# Patient Record
Sex: FEMALE | Race: BLACK OR AFRICAN AMERICAN | NOT HISPANIC OR LATINO
[De-identification: names, ages, dates, MRNs, and addresses within clinical notes are randomized per-mention and may not be internally consistent; named-entity substitution may affect disease eponyms.]

---

## 2020-08-13 PROBLEM — Z00.00 ENCOUNTER FOR PREVENTIVE HEALTH EXAMINATION: Status: ACTIVE | Noted: 2020-08-13

## 2020-08-21 ENCOUNTER — APPOINTMENT (OUTPATIENT)
Dept: PULMONOLOGY | Facility: CLINIC | Age: 55
End: 2020-08-21

## 2020-09-13 ENCOUNTER — RESULT REVIEW (OUTPATIENT)
Age: 55
End: 2020-09-13

## 2020-09-13 ENCOUNTER — INPATIENT (INPATIENT)
Facility: HOSPITAL | Age: 55
LOS: 7 days | Discharge: HOME | DRG: 314 | End: 2020-09-21
Attending: THORACIC SURGERY (CARDIOTHORACIC VASCULAR SURGERY) | Admitting: THORACIC SURGERY (CARDIOTHORACIC VASCULAR SURGERY)
Payer: MEDICAID

## 2020-09-13 VITALS — TEMPERATURE: 98 F

## 2020-09-13 DIAGNOSIS — F17.200 NICOTINE DEPENDENCE, UNSPECIFIED, UNCOMPLICATED: ICD-10-CM

## 2020-09-13 DIAGNOSIS — I10 ESSENTIAL (PRIMARY) HYPERTENSION: ICD-10-CM

## 2020-09-13 DIAGNOSIS — I31.4 CARDIAC TAMPONADE: ICD-10-CM

## 2020-09-13 DIAGNOSIS — I31.3 PERICARDIAL EFFUSION (NONINFLAMMATORY): ICD-10-CM

## 2020-09-13 DIAGNOSIS — Z29.9 ENCOUNTER FOR PROPHYLACTIC MEASURES, UNSPECIFIED: ICD-10-CM

## 2020-09-13 DIAGNOSIS — N18.9 CHRONIC KIDNEY DISEASE, UNSPECIFIED: ICD-10-CM

## 2020-09-13 DIAGNOSIS — J44.9 CHRONIC OBSTRUCTIVE PULMONARY DISEASE, UNSPECIFIED: ICD-10-CM

## 2020-09-13 LAB
A1C WITH ESTIMATED AVERAGE GLUCOSE RESULT: 5.7 % — HIGH (ref 4–5.6)
ABO RH CONFIRMATION: SIGNIFICANT CHANGE UP
ALBUMIN FLD-MCNC: 3.5 G/DL — SIGNIFICANT CHANGE UP
ALBUMIN SERPL ELPH-MCNC: 3.5 G/DL — SIGNIFICANT CHANGE UP (ref 3.3–5.2)
ALP SERPL-CCNC: 82 U/L — SIGNIFICANT CHANGE UP (ref 40–120)
ALT FLD-CCNC: 12 U/L — SIGNIFICANT CHANGE UP
ANION GAP SERPL CALC-SCNC: 12 MMOL/L — SIGNIFICANT CHANGE UP (ref 5–17)
APPEARANCE UR: CLEAR — SIGNIFICANT CHANGE UP
APTT BLD: 26.8 SEC — LOW (ref 27.5–35.5)
AST SERPL-CCNC: 15 U/L — SIGNIFICANT CHANGE UP
B PERT IGG+IGM PNL SER: ABNORMAL
BACTERIA # UR AUTO: ABNORMAL
BASOPHILS # BLD AUTO: 0.03 K/UL — SIGNIFICANT CHANGE UP (ref 0–0.2)
BASOPHILS NFR BLD AUTO: 0.3 % — SIGNIFICANT CHANGE UP (ref 0–2)
BILIRUB SERPL-MCNC: <0.2 MG/DL — LOW (ref 0.4–2)
BILIRUB UR-MCNC: NEGATIVE — SIGNIFICANT CHANGE UP
BLD GP AB SCN SERPL QL: SIGNIFICANT CHANGE UP
BUN SERPL-MCNC: 52 MG/DL — HIGH (ref 8–20)
CALCIUM SERPL-MCNC: 9 MG/DL — SIGNIFICANT CHANGE UP (ref 8.6–10.2)
CHLORIDE SERPL-SCNC: 97 MMOL/L — LOW (ref 98–107)
CO2 SERPL-SCNC: 27 MMOL/L — SIGNIFICANT CHANGE UP (ref 22–29)
COLOR FLD: ABNORMAL
COLOR SPEC: YELLOW — SIGNIFICANT CHANGE UP
CREAT SERPL-MCNC: 3.86 MG/DL — HIGH (ref 0.5–1.3)
DIFF PNL FLD: ABNORMAL
EOSINOPHIL # BLD AUTO: 0.16 K/UL — SIGNIFICANT CHANGE UP (ref 0–0.5)
EOSINOPHIL NFR BLD AUTO: 1.6 % — SIGNIFICANT CHANGE UP (ref 0–6)
EPI CELLS # UR: SIGNIFICANT CHANGE UP
ESTIMATED AVERAGE GLUCOSE: 117 MG/DL — HIGH (ref 68–114)
FLUID INTAKE SUBSTANCE CLASS: SIGNIFICANT CHANGE UP
FLUID SEGMENTED GRANULOCYTES: 3 % — SIGNIFICANT CHANGE UP
GLUCOSE FLD-MCNC: 49 MG/DL — SIGNIFICANT CHANGE UP
GLUCOSE SERPL-MCNC: 104 MG/DL — HIGH (ref 70–99)
GLUCOSE UR QL: NEGATIVE MG/DL — SIGNIFICANT CHANGE UP
GRAM STN FLD: SIGNIFICANT CHANGE UP
HCT VFR BLD CALC: 38 % — SIGNIFICANT CHANGE UP (ref 34.5–45)
HGB BLD-MCNC: 11.8 G/DL — SIGNIFICANT CHANGE UP (ref 11.5–15.5)
IMM GRANULOCYTES NFR BLD AUTO: 1.3 % — SIGNIFICANT CHANGE UP (ref 0–1.5)
INR BLD: 1.14 RATIO — SIGNIFICANT CHANGE UP (ref 0.88–1.16)
KETONES UR-MCNC: NEGATIVE — SIGNIFICANT CHANGE UP
LDH SERPL L TO P-CCNC: 553 U/L — SIGNIFICANT CHANGE UP
LEUKOCYTE ESTERASE UR-ACNC: NEGATIVE — SIGNIFICANT CHANGE UP
LYMPHOCYTES # BLD AUTO: 1.78 K/UL — SIGNIFICANT CHANGE UP (ref 1–3.3)
LYMPHOCYTES # BLD AUTO: 17.5 % — SIGNIFICANT CHANGE UP (ref 13–44)
LYMPHOCYTES # FLD: 6 % — SIGNIFICANT CHANGE UP
MCHC RBC-ENTMCNC: 29.1 PG — SIGNIFICANT CHANGE UP (ref 27–34)
MCHC RBC-ENTMCNC: 31.1 GM/DL — LOW (ref 32–36)
MCV RBC AUTO: 93.8 FL — SIGNIFICANT CHANGE UP (ref 80–100)
MESOTHL CELL # FLD: 75 % — SIGNIFICANT CHANGE UP
MONOCYTES # BLD AUTO: 0.82 K/UL — SIGNIFICANT CHANGE UP (ref 0–0.9)
MONOCYTES NFR BLD AUTO: 8.1 % — SIGNIFICANT CHANGE UP (ref 2–14)
MONOS+MACROS # FLD: 16 % — SIGNIFICANT CHANGE UP
MRSA PCR RESULT.: SIGNIFICANT CHANGE UP
NEUTROPHILS # BLD AUTO: 7.25 K/UL — SIGNIFICANT CHANGE UP (ref 1.8–7.4)
NEUTROPHILS NFR BLD AUTO: 71.2 % — SIGNIFICANT CHANGE UP (ref 43–77)
NITRITE UR-MCNC: NEGATIVE — SIGNIFICANT CHANGE UP
NT-PROBNP SERPL-SCNC: 127 PG/ML — SIGNIFICANT CHANGE UP (ref 0–300)
PH UR: 6 — SIGNIFICANT CHANGE UP (ref 5–8)
PLATELET # BLD AUTO: 292 K/UL — SIGNIFICANT CHANGE UP (ref 150–400)
POTASSIUM SERPL-MCNC: 4.6 MMOL/L — SIGNIFICANT CHANGE UP (ref 3.5–5.3)
POTASSIUM SERPL-SCNC: 4.6 MMOL/L — SIGNIFICANT CHANGE UP (ref 3.5–5.3)
PROT FLD-MCNC: 5.3 G/DL — SIGNIFICANT CHANGE UP
PROT SERPL-MCNC: 6.9 G/DL — SIGNIFICANT CHANGE UP (ref 6.6–8.7)
PROT UR-MCNC: 100 MG/DL
PROTHROM AB SERPL-ACNC: 13.1 SEC — SIGNIFICANT CHANGE UP (ref 10.6–13.6)
RBC # BLD: 4.05 M/UL — SIGNIFICANT CHANGE UP (ref 3.8–5.2)
RBC # FLD: 17 % — HIGH (ref 10.3–14.5)
RBC CASTS # UR COMP ASSIST: SIGNIFICANT CHANGE UP /HPF (ref 0–4)
RCV VOL RI: 3000 /UL — HIGH (ref 0–0)
S AUREUS DNA NOSE QL NAA+PROBE: SIGNIFICANT CHANGE UP
SODIUM SERPL-SCNC: 136 MMOL/L — SIGNIFICANT CHANGE UP (ref 135–145)
SP GR SPEC: 1.01 — SIGNIFICANT CHANGE UP (ref 1.01–1.02)
SPECIMEN SOURCE: SIGNIFICANT CHANGE UP
TOTAL NUCLEATED CELL COUNT, BODY FLUID: 1974 /UL — SIGNIFICANT CHANGE UP
TSH SERPL-MCNC: 1.86 UIU/ML — SIGNIFICANT CHANGE UP (ref 0.27–4.2)
TUBE TYPE: SIGNIFICANT CHANGE UP
UROBILINOGEN FLD QL: NEGATIVE MG/DL — SIGNIFICANT CHANGE UP
WBC # BLD: 10.17 K/UL — SIGNIFICANT CHANGE UP (ref 3.8–10.5)
WBC # FLD AUTO: 10.17 K/UL — SIGNIFICANT CHANGE UP (ref 3.8–10.5)
WBC UR QL: SIGNIFICANT CHANGE UP

## 2020-09-13 PROCEDURE — 88305 TISSUE EXAM BY PATHOLOGIST: CPT | Mod: 26

## 2020-09-13 PROCEDURE — 88342 IMHCHEM/IMCYTCHM 1ST ANTB: CPT | Mod: 26

## 2020-09-13 PROCEDURE — 88112 CYTOPATH CELL ENHANCE TECH: CPT | Mod: 26

## 2020-09-13 PROCEDURE — 93306 TTE W/DOPPLER COMPLETE: CPT | Mod: 26

## 2020-09-13 PROCEDURE — 99222 1ST HOSP IP/OBS MODERATE 55: CPT

## 2020-09-13 PROCEDURE — 99223 1ST HOSP IP/OBS HIGH 75: CPT

## 2020-09-13 PROCEDURE — 88341 IMHCHEM/IMCYTCHM EA ADD ANTB: CPT | Mod: 26

## 2020-09-13 PROCEDURE — 93308 TTE F-UP OR LMTD: CPT | Mod: 26,76,59

## 2020-09-13 PROCEDURE — 71045 X-RAY EXAM CHEST 1 VIEW: CPT | Mod: 26

## 2020-09-13 PROCEDURE — 71250 CT THORAX DX C-: CPT | Mod: 26

## 2020-09-13 RX ORDER — LANOLIN ALCOHOL/MO/W.PET/CERES
5 CREAM (GRAM) TOPICAL AT BEDTIME
Refills: 0 | Status: DISCONTINUED | OUTPATIENT
Start: 2020-09-13 | End: 2020-09-21

## 2020-09-13 RX ORDER — CEFTRIAXONE 500 MG/1
2000 INJECTION, POWDER, FOR SOLUTION INTRAMUSCULAR; INTRAVENOUS EVERY 24 HOURS
Refills: 0 | Status: COMPLETED | OUTPATIENT
Start: 2020-09-13 | End: 2020-09-17

## 2020-09-13 RX ORDER — FENTANYL CITRATE 50 UG/ML
25 INJECTION INTRAVENOUS ONCE
Refills: 0 | Status: DISCONTINUED | OUTPATIENT
Start: 2020-09-13 | End: 2020-09-13

## 2020-09-13 RX ORDER — PANTOPRAZOLE SODIUM 20 MG/1
40 TABLET, DELAYED RELEASE ORAL
Refills: 0 | Status: DISCONTINUED | OUTPATIENT
Start: 2020-09-13 | End: 2020-09-21

## 2020-09-13 RX ORDER — MIRTAZAPINE 45 MG/1
15 TABLET, ORALLY DISINTEGRATING ORAL AT BEDTIME
Refills: 0 | Status: DISCONTINUED | OUTPATIENT
Start: 2020-09-13 | End: 2020-09-21

## 2020-09-13 RX ORDER — SODIUM CHLORIDE 9 MG/ML
3 INJECTION INTRAMUSCULAR; INTRAVENOUS; SUBCUTANEOUS EVERY 8 HOURS
Refills: 0 | Status: DISCONTINUED | OUTPATIENT
Start: 2020-09-13 | End: 2020-09-21

## 2020-09-13 RX ORDER — ATORVASTATIN CALCIUM 80 MG/1
40 TABLET, FILM COATED ORAL AT BEDTIME
Refills: 0 | Status: DISCONTINUED | OUTPATIENT
Start: 2020-09-13 | End: 2020-09-21

## 2020-09-13 RX ORDER — INFLUENZA VIRUS VACCINE 15; 15; 15; 15 UG/.5ML; UG/.5ML; UG/.5ML; UG/.5ML
0.5 SUSPENSION INTRAMUSCULAR ONCE
Refills: 0 | Status: DISCONTINUED | OUTPATIENT
Start: 2020-09-13 | End: 2020-09-21

## 2020-09-13 RX ORDER — AMLODIPINE BESYLATE 2.5 MG/1
10 TABLET ORAL DAILY
Refills: 0 | Status: DISCONTINUED | OUTPATIENT
Start: 2020-09-13 | End: 2020-09-21

## 2020-09-13 RX ORDER — ACETAMINOPHEN 500 MG
650 TABLET ORAL EVERY 6 HOURS
Refills: 0 | Status: DISCONTINUED | OUTPATIENT
Start: 2020-09-13 | End: 2020-09-19

## 2020-09-13 RX ORDER — ASPIRIN/CALCIUM CARB/MAGNESIUM 324 MG
81 TABLET ORAL DAILY
Refills: 0 | Status: DISCONTINUED | OUTPATIENT
Start: 2020-09-13 | End: 2020-09-13

## 2020-09-13 RX ORDER — FLUTICASONE PROPIONATE 50 MCG
1 SPRAY, SUSPENSION NASAL
Refills: 0 | Status: DISCONTINUED | OUTPATIENT
Start: 2020-09-13 | End: 2020-09-21

## 2020-09-13 RX ORDER — OXYCODONE HYDROCHLORIDE 5 MG/1
10 TABLET ORAL EVERY 6 HOURS
Refills: 0 | Status: DISCONTINUED | OUTPATIENT
Start: 2020-09-13 | End: 2020-09-19

## 2020-09-13 RX ORDER — ACETAMINOPHEN 500 MG
1000 TABLET ORAL ONCE
Refills: 0 | Status: COMPLETED | OUTPATIENT
Start: 2020-09-13 | End: 2020-09-13

## 2020-09-13 RX ORDER — ALBUTEROL 90 UG/1
2 AEROSOL, METERED ORAL EVERY 6 HOURS
Refills: 0 | Status: DISCONTINUED | OUTPATIENT
Start: 2020-09-13 | End: 2020-09-21

## 2020-09-13 RX ORDER — HEPARIN SODIUM 5000 [USP'U]/ML
5000 INJECTION INTRAVENOUS; SUBCUTANEOUS EVERY 12 HOURS
Refills: 0 | Status: DISCONTINUED | OUTPATIENT
Start: 2020-09-13 | End: 2020-09-13

## 2020-09-13 RX ORDER — NICOTINE POLACRILEX 2 MG
1 GUM BUCCAL DAILY
Refills: 0 | Status: DISCONTINUED | OUTPATIENT
Start: 2020-09-13 | End: 2020-09-21

## 2020-09-13 RX ORDER — OXYCODONE HYDROCHLORIDE 5 MG/1
5 TABLET ORAL EVERY 6 HOURS
Refills: 0 | Status: DISCONTINUED | OUTPATIENT
Start: 2020-09-13 | End: 2020-09-19

## 2020-09-13 RX ORDER — LACTOBACILLUS ACIDOPHILUS 100MM CELL
1 CAPSULE ORAL
Refills: 0 | Status: DISCONTINUED | OUTPATIENT
Start: 2020-09-13 | End: 2020-09-21

## 2020-09-13 RX ADMIN — Medication 110 MILLIGRAM(S): at 18:03

## 2020-09-13 RX ADMIN — ATORVASTATIN CALCIUM 40 MILLIGRAM(S): 80 TABLET, FILM COATED ORAL at 21:44

## 2020-09-13 RX ADMIN — Medication 1000 MILLIGRAM(S): at 10:10

## 2020-09-13 RX ADMIN — ALBUTEROL 2 PUFF(S): 90 AEROSOL, METERED ORAL at 20:04

## 2020-09-13 RX ADMIN — Medication 1 TABLET(S): at 19:12

## 2020-09-13 RX ADMIN — Medication 1 SPRAY(S): at 18:03

## 2020-09-13 RX ADMIN — Medication 650 MILLIGRAM(S): at 18:50

## 2020-09-13 RX ADMIN — Medication 400 MILLIGRAM(S): at 09:39

## 2020-09-13 RX ADMIN — OXYCODONE HYDROCHLORIDE 10 MILLIGRAM(S): 5 TABLET ORAL at 20:18

## 2020-09-13 RX ADMIN — SODIUM CHLORIDE 3 MILLILITER(S): 9 INJECTION INTRAMUSCULAR; INTRAVENOUS; SUBCUTANEOUS at 09:50

## 2020-09-13 RX ADMIN — SODIUM CHLORIDE 3 MILLILITER(S): 9 INJECTION INTRAMUSCULAR; INTRAVENOUS; SUBCUTANEOUS at 21:47

## 2020-09-13 RX ADMIN — Medication 1 TABLET(S): at 06:39

## 2020-09-13 RX ADMIN — PANTOPRAZOLE SODIUM 40 MILLIGRAM(S): 20 TABLET, DELAYED RELEASE ORAL at 06:39

## 2020-09-13 RX ADMIN — OXYCODONE HYDROCHLORIDE 10 MILLIGRAM(S): 5 TABLET ORAL at 19:18

## 2020-09-13 RX ADMIN — Medication 1 SPRAY(S): at 10:01

## 2020-09-13 RX ADMIN — Medication 110 MILLIGRAM(S): at 06:39

## 2020-09-13 RX ADMIN — CEFTRIAXONE 100 MILLIGRAM(S): 500 INJECTION, POWDER, FOR SOLUTION INTRAMUSCULAR; INTRAVENOUS at 18:03

## 2020-09-13 RX ADMIN — MIRTAZAPINE 15 MILLIGRAM(S): 45 TABLET, ORALLY DISINTEGRATING ORAL at 21:44

## 2020-09-13 RX ADMIN — SODIUM CHLORIDE 3 MILLILITER(S): 9 INJECTION INTRAMUSCULAR; INTRAVENOUS; SUBCUTANEOUS at 06:47

## 2020-09-13 RX ADMIN — AMLODIPINE BESYLATE 10 MILLIGRAM(S): 2.5 TABLET ORAL at 06:39

## 2020-09-13 RX ADMIN — Medication 650 MILLIGRAM(S): at 19:15
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
## 2020-09-13 NOTE — H&P ADULT - PROBLEM SELECTOR PLAN 4
Labs pending  Will monitor renal function, labs, urine output closely, may need renal consult during this admission

## 2020-09-13 NOTE — H&P ADULT - ATTENDING COMMENTS
Patient seen and examined. All data and results reviewed independently. Moderate-large pericardial effusion secondary to likely end stage renal disease. Repeat ECHO this AM. NPO until repeat ECHO is performed. I reviewed all results with the Patient.     Consult cardiology for pericardiocentesis.
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
## 2020-09-13 NOTE — PATIENT PROFILE ADULT - TRANSPORTATION - CHOOSE ALL APPLY
public transportation, none available/rides, unreliable from others/public transportation, does not know how to access/car, none

## 2020-09-13 NOTE — H&P ADULT - PROBLEM SELECTOR PLAN 1
Currently hemodynamically stable  Urgent CT Chest pending, non contrast  Maintain NPO  Hold all anticoagulants

## 2020-09-13 NOTE — H&P ADULT - NSICDXPASTMEDICALHX_GEN_ALL_CORE_FT
PAST MEDICAL HISTORY:  CKD (chronic kidney disease)     COPD, mild     Current smoker     HTN (hypertension)
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
## 2020-09-14 DIAGNOSIS — D72.829 ELEVATED WHITE BLOOD CELL COUNT, UNSPECIFIED: ICD-10-CM

## 2020-09-14 DIAGNOSIS — R73.03 PREDIABETES: ICD-10-CM

## 2020-09-14 DIAGNOSIS — I10 ESSENTIAL (PRIMARY) HYPERTENSION: ICD-10-CM

## 2020-09-14 DIAGNOSIS — F31.9 BIPOLAR DISORDER, UNSPECIFIED: ICD-10-CM

## 2020-09-14 DIAGNOSIS — J18.9 PNEUMONIA, UNSPECIFIED ORGANISM: ICD-10-CM

## 2020-09-14 DIAGNOSIS — E87.5 HYPERKALEMIA: ICD-10-CM

## 2020-09-14 DIAGNOSIS — N18.9 CHRONIC KIDNEY DISEASE, UNSPECIFIED: ICD-10-CM

## 2020-09-14 LAB
ANION GAP SERPL CALC-SCNC: 11 MMOL/L — SIGNIFICANT CHANGE UP (ref 5–17)
ANION GAP SERPL CALC-SCNC: 13 MMOL/L — SIGNIFICANT CHANGE UP (ref 5–17)
BUN SERPL-MCNC: 52 MG/DL — HIGH (ref 8–20)
BUN SERPL-MCNC: 53 MG/DL — HIGH (ref 8–20)
CALCIUM SERPL-MCNC: 9 MG/DL — SIGNIFICANT CHANGE UP (ref 8.6–10.2)
CALCIUM SERPL-MCNC: 9.1 MG/DL — SIGNIFICANT CHANGE UP (ref 8.6–10.2)
CHLORIDE SERPL-SCNC: 96 MMOL/L — LOW (ref 98–107)
CHLORIDE SERPL-SCNC: 96 MMOL/L — LOW (ref 98–107)
CO2 SERPL-SCNC: 21 MMOL/L — LOW (ref 22–29)
CO2 SERPL-SCNC: 27 MMOL/L — SIGNIFICANT CHANGE UP (ref 22–29)
CREAT SERPL-MCNC: 3.17 MG/DL — HIGH (ref 0.5–1.3)
CREAT SERPL-MCNC: 3.18 MG/DL — HIGH (ref 0.5–1.3)
GLUCOSE BLDC GLUCOMTR-MCNC: 107 MG/DL — HIGH (ref 70–99)
GLUCOSE SERPL-MCNC: 103 MG/DL — HIGH (ref 70–99)
GLUCOSE SERPL-MCNC: 97 MG/DL — SIGNIFICANT CHANGE UP (ref 70–99)
HCT VFR BLD CALC: 43.1 % — SIGNIFICANT CHANGE UP (ref 34.5–45)
HCV AB S/CO SERPL IA: 0.09 S/CO — SIGNIFICANT CHANGE UP (ref 0–0.99)
HCV AB SERPL-IMP: SIGNIFICANT CHANGE UP
HGB BLD-MCNC: 13.2 G/DL — SIGNIFICANT CHANGE UP (ref 11.5–15.5)
MAGNESIUM SERPL-MCNC: 1.9 MG/DL — SIGNIFICANT CHANGE UP (ref 1.6–2.6)
MCHC RBC-ENTMCNC: 28.9 PG — SIGNIFICANT CHANGE UP (ref 27–34)
MCHC RBC-ENTMCNC: 30.6 GM/DL — LOW (ref 32–36)
MCV RBC AUTO: 94.5 FL — SIGNIFICANT CHANGE UP (ref 80–100)
NIGHT BLUE STAIN TISS: SIGNIFICANT CHANGE UP
PLATELET # BLD AUTO: 213 K/UL — SIGNIFICANT CHANGE UP (ref 150–400)
POTASSIUM SERPL-MCNC: 4.7 MMOL/L — SIGNIFICANT CHANGE UP (ref 3.5–5.3)
POTASSIUM SERPL-MCNC: 5.5 MMOL/L — HIGH (ref 3.5–5.3)
POTASSIUM SERPL-SCNC: 4.7 MMOL/L — SIGNIFICANT CHANGE UP (ref 3.5–5.3)
POTASSIUM SERPL-SCNC: 5.5 MMOL/L — HIGH (ref 3.5–5.3)
RBC # BLD: 4.56 M/UL — SIGNIFICANT CHANGE UP (ref 3.8–5.2)
RBC # FLD: 17.2 % — HIGH (ref 10.3–14.5)
SODIUM SERPL-SCNC: 130 MMOL/L — LOW (ref 135–145)
SODIUM SERPL-SCNC: 134 MMOL/L — LOW (ref 135–145)
SPECIMEN SOURCE: SIGNIFICANT CHANGE UP
WBC # BLD: 17.79 K/UL — HIGH (ref 3.8–10.5)
WBC # FLD AUTO: 17.79 K/UL — HIGH (ref 3.8–10.5)

## 2020-09-14 PROCEDURE — 99223 1ST HOSP IP/OBS HIGH 75: CPT

## 2020-09-14 PROCEDURE — 99232 SBSQ HOSP IP/OBS MODERATE 35: CPT

## 2020-09-14 PROCEDURE — 93010 ELECTROCARDIOGRAM REPORT: CPT

## 2020-09-14 PROCEDURE — 71045 X-RAY EXAM CHEST 1 VIEW: CPT | Mod: 26

## 2020-09-14 PROCEDURE — 76770 US EXAM ABDO BACK WALL COMP: CPT | Mod: 26

## 2020-09-14 RX ORDER — QUETIAPINE FUMARATE 200 MG/1
300 TABLET, FILM COATED ORAL DAILY
Refills: 0 | Status: DISCONTINUED | OUTPATIENT
Start: 2020-09-14 | End: 2020-09-21

## 2020-09-14 RX ORDER — SODIUM BICARBONATE 1 MEQ/ML
0.05 SYRINGE (ML) INTRAVENOUS
Qty: 75 | Refills: 0 | Status: DISCONTINUED | OUTPATIENT
Start: 2020-09-14 | End: 2020-09-16

## 2020-09-14 RX ORDER — ERGOCALCIFEROL 1.25 MG/1
50000 CAPSULE ORAL
Refills: 0 | Status: DISCONTINUED | OUTPATIENT
Start: 2020-09-14 | End: 2020-09-21

## 2020-09-14 RX ORDER — SODIUM ZIRCONIUM CYCLOSILICATE 10 G/10G
10 POWDER, FOR SUSPENSION ORAL ONCE
Refills: 0 | Status: COMPLETED | OUTPATIENT
Start: 2020-09-14 | End: 2020-09-14

## 2020-09-14 RX ADMIN — SODIUM CHLORIDE 3 MILLILITER(S): 9 INJECTION INTRAMUSCULAR; INTRAVENOUS; SUBCUTANEOUS at 13:54

## 2020-09-14 RX ADMIN — SODIUM CHLORIDE 3 MILLILITER(S): 9 INJECTION INTRAMUSCULAR; INTRAVENOUS; SUBCUTANEOUS at 21:46

## 2020-09-14 RX ADMIN — ERGOCALCIFEROL 50000 UNIT(S): 1.25 CAPSULE ORAL at 14:41

## 2020-09-14 RX ADMIN — SODIUM ZIRCONIUM CYCLOSILICATE 10 GRAM(S): 10 POWDER, FOR SUSPENSION ORAL at 13:55

## 2020-09-14 RX ADMIN — Medication 110 MILLIGRAM(S): at 18:16

## 2020-09-14 RX ADMIN — ALBUTEROL 2 PUFF(S): 90 AEROSOL, METERED ORAL at 08:15

## 2020-09-14 RX ADMIN — MIRTAZAPINE 15 MILLIGRAM(S): 45 TABLET, ORALLY DISINTEGRATING ORAL at 21:53

## 2020-09-14 RX ADMIN — OXYCODONE HYDROCHLORIDE 10 MILLIGRAM(S): 5 TABLET ORAL at 23:11

## 2020-09-14 RX ADMIN — ATORVASTATIN CALCIUM 40 MILLIGRAM(S): 80 TABLET, FILM COATED ORAL at 21:53

## 2020-09-14 RX ADMIN — OXYCODONE HYDROCHLORIDE 5 MILLIGRAM(S): 5 TABLET ORAL at 04:12

## 2020-09-14 RX ADMIN — ALBUTEROL 2 PUFF(S): 90 AEROSOL, METERED ORAL at 03:26

## 2020-09-14 RX ADMIN — OXYCODONE HYDROCHLORIDE 10 MILLIGRAM(S): 5 TABLET ORAL at 09:53

## 2020-09-14 RX ADMIN — SODIUM CHLORIDE 3 MILLILITER(S): 9 INJECTION INTRAMUSCULAR; INTRAVENOUS; SUBCUTANEOUS at 05:19

## 2020-09-14 RX ADMIN — Medication 110 MILLIGRAM(S): at 06:26

## 2020-09-14 RX ADMIN — AMLODIPINE BESYLATE 10 MILLIGRAM(S): 2.5 TABLET ORAL at 05:19

## 2020-09-14 RX ADMIN — Medication 1 SPRAY(S): at 05:18

## 2020-09-14 RX ADMIN — Medication 1 TABLET(S): at 05:19

## 2020-09-14 RX ADMIN — PANTOPRAZOLE SODIUM 40 MILLIGRAM(S): 20 TABLET, DELAYED RELEASE ORAL at 05:19

## 2020-09-14 RX ADMIN — Medication 1 TABLET(S): at 17:35

## 2020-09-14 RX ADMIN — OXYCODONE HYDROCHLORIDE 10 MILLIGRAM(S): 5 TABLET ORAL at 10:30

## 2020-09-14 RX ADMIN — ALBUTEROL 2 PUFF(S): 90 AEROSOL, METERED ORAL at 15:24

## 2020-09-14 RX ADMIN — Medication 60 MEQ/KG/HR: at 21:55

## 2020-09-14 RX ADMIN — CEFTRIAXONE 100 MILLIGRAM(S): 500 INJECTION, POWDER, FOR SOLUTION INTRAMUSCULAR; INTRAVENOUS at 17:35

## 2020-09-14 RX ADMIN — OXYCODONE HYDROCHLORIDE 5 MILLIGRAM(S): 5 TABLET ORAL at 03:29

## 2020-09-14 RX ADMIN — Medication 1 SPRAY(S): at 17:35

## 2020-09-14 RX ADMIN — ALBUTEROL 2 PUFF(S): 90 AEROSOL, METERED ORAL at 20:43

## 2020-09-14 NOTE — CONSULT NOTE ADULT - PROBLEM SELECTOR RECOMMENDATION 9
- HgA1C noted - 5.7 - will need to follow up with PMD , ADA ,   takes ASA 81 mg daily - prophylaxis - restart once OK with CTS / TTE with concern for tamponade , s/p pericardiocentesis , POD # 1 , feeling better , continue drain as per primary team , follow up cultures
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
## 2020-09-14 NOTE — CONSULT NOTE ADULT - ATTENDING COMMENTS
Patient assessed at [facility]     Hemodynamically stable but tachycardic with large pericardial effusion with tamponade physiology and going to cath lab today for pericardiocentesis and possible drain placement with Dr. Lubin
Thank you for the courtesy of this consult , will follow .
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
## 2020-09-14 NOTE — CONSULT NOTE ADULT - PROBLEM SELECTOR RECOMMENDATION 3
- patient aware of chronic renal dz - not sure what stage , never followed with renal - renal appreciated , started on bycarb , avoid nephrotoxic meds , follow U/S of kidneys , follow BMP

## 2020-09-14 NOTE — PROGRESS NOTE ADULT - SUBJECTIVE AND OBJECTIVE BOX
[place] CARDIOLOGY-[facility]                                                               Office: [address] [address]                                                              Telephone: [phone]. Fax:[phone]                                                                             PROGRESS NOTE  Reason for follow up: Large pericardial effusion s/p pericardiocentesis and placement of pericardial drain  Overnight: No new events.   Update:     Subjective: "  ______________________"      	  Vitals:  T(C): 37.1 (09-14-20 @ 10:00), Max: 37.1 (09-14-20 @ 10:00)  HR: 109 (09-14-20 @ 10:00) (95 - 112)  BP: 138/93 (09-14-20 @ 10:00) (113/93 - 156/104)  RR: 22 (09-14-20 @ 10:00) (18 - 39)  SpO2: 93% (09-14-20 @ 10:00) (92% - 100%)  Wt(kg): --  I&O's Summary    13 Sep 2020 07:01  -  14 Sep 2020 07:00  --------------------------------------------------------  IN: 250 mL / OUT: 2905 mL / NET: -2655 mL      Weight (kg): 96.9 (09-13 @ 00:15)      PHYSICAL EXAM:  Appearance: Comfortable. No acute distress  HEENT:  Head and neck: Atraumatic. Normocephalic.  Normal oral mucosa, PERRL, Neck is supple. No JVD, No carotid bruit.   Neurologic: A & O x 3, no focal deficits. EOMI.  Lymphatic: No cervical lymphadenopathy  Cardiovascular: Normal S1 S2, No murmur, rubs/gallops. No JVD, No edema  Respiratory: Lungs clear to auscultation  Gastrointestinal:  Soft, Non-tender, + BS  Lower Extremities: No edema  Psychiatry: Patient is calm. No agitation. Mood & affect appropriate  Skin: No rashes/ ecchymoses/cyanosis/ulcers visualized on the face, hands or feet.      CURRENT MEDICATIONS:  amLODIPine   Tablet 10 milliGRAM(s) Oral daily    ALBUTerol    90 MICROgram(s) HFA Inhaler  cefTRIAXone   IVPB  doxycycline IVPB  mirtazapine Soltab  pantoprazole    Tablet  atorvastatin  ergocalciferol  fluticasone propionate 50 MICROgram(s)/spray Nasal Spray  influenza   Vaccine  sodium bicarbonate  Infusion  sodium chloride 0.9% lock flush      DIAGNOSTIC TESTING:  [ ] Echocardiogram:   [ ]  Catheterization:  [ ] Stress Test:    OTHER: 	      LABS:	 	  CARDIAC MARKERS ( 13 Sep 2020 00:47 )  x     / x     / x     / x     / x      p-BNP 13 Sep 2020 00:47: 127 pg/mL                          13.2   17.79 )-----------( 213      ( 14 Sep 2020 06:18 )             43.1     09-14    130<L>  |  96<L>  |  53.0<H>  ----------------------------<  103<H>  5.5<H>   |  21.0<L>  |  3.18<H>    Ca    9.0      14 Sep 2020 06:18  Mg     1.9     09-14    TPro  6.9  /  Alb  3.5  /  TBili  <0.2<L>  /  DBili  x   /  AST  15  /  ALT  12  /  AlkPhos  82  09-13    proBNP: Serum Pro-Brain Natriuretic Peptide: 127 pg/mL (09-13 @ 00:47)    Lipid Profile:   HgA1c:   TSH: Thyroid Stimulating Hormone, Serum: 1.86 uIU/mL        TELEMETRY: Reviewed    ECG:  Reviewed by me. 	       [place] CARDIOLOGY-[facility]                                                               Office: [address] [address]                                                              Telephone: [phone]. Fax:[phone]                                                                             PROGRESS NOTE  Reason for follow up: Large pericardial effusion s/p pericardiocentesis and placement of pericardial drain  Overnight: No new events.   Update:     Subjective: "  ______________________"      	  Vitals:  T(C): 37.1 (09-14-20 @ 10:00), Max: 37.1 (09-14-20 @ 10:00)  HR: 109 (09-14-20 @ 10:00) (95 - 112)  BP: 138/93 (09-14-20 @ 10:00) (113/93 - 156/104)  RR: 22 (09-14-20 @ 10:00) (18 - 39)  SpO2: 93% (09-14-20 @ 10:00) (92% - 100%)  Wt(kg): --  I&O's Summary    13 Sep 2020 07:01  -  14 Sep 2020 07:00  --------------------------------------------------------  IN: 250 mL / OUT: 2905 mL / NET: -2655 mL      Weight (kg): 96.9 (09-13 @ 00:15)      PHYSICAL EXAM:  Appearance: Comfortable. No acute distress  HEENT:  Head and neck: Atraumatic. Normocephalic.  Normal oral mucosa, PERRL, Neck is supple. No JVD, No carotid bruit.   Neurologic: A & O x 3, no focal deficits. EOMI.  Lymphatic: No cervical lymphadenopathy  Cardiovascular: Normal S1 S2, No murmur, rubs/gallops. No JVD, No edema  Respiratory: Lungs clear to auscultation  Gastrointestinal:  Soft, Non-tender, + BS  Lower Extremities: No edema  Psychiatry: Patient is calm. No agitation. Mood & affect appropriate  Skin: No rashes/ ecchymoses/cyanosis/ulcers visualized on the face, hands or feet.      CURRENT MEDICATIONS:  amLODIPine   Tablet 10 milliGRAM(s) Oral daily    ALBUTerol    90 MICROgram(s) HFA Inhaler  cefTRIAXone   IVPB  doxycycline IVPB  mirtazapine Soltab  pantoprazole    Tablet  atorvastatin  ergocalciferol  fluticasone propionate 50 MICROgram(s)/spray Nasal Spray  influenza   Vaccine  sodium bicarbonate  Infusion  sodium chloride 0.9% lock flush      DIAGNOSTIC TESTING:  [x ] Echocardiogram:     < from: TTE Echo Complete w/ Contrast w/ Doppler (09.13.20 @ 10:24) >  Summary:   1. The left atrium is normal in size.   2. Left ventricular ejection fraction, by visual estimation, is 60 to 65%.   3. The right atrium is normal in size.   4. Normal right ventricular size and function.   5. Valves with color Doppler not evaluated.   6. Large pericardial effusion. Findings are suggestive of cardiac tamponade.   7. Access: 1.5 cm fluid space available sub-costally. Significant apical and apical lateral access available (>1.5 cm).   8. Team informed.    MD Dk, RPVI Electronically signed on 9/13/2020 at 11:39:13 AM    < end of copied text >    < from: TTE Echo Limited or F/U (09.13.20 @ 13:27) >  Summary:   1. Smallpericardial effusion.   2. S/p Pericardiocentesis. Initial images showed larged pericardial effusion. s/p pericardiocentesis. Final images shows small effusion.    MD Dk, RPVI Electronically signed on 9/13/2020 at 7:33:51 PM    < end of copied text >    [ ]  Catheterization:  [ ] Stress Test:    OTHER: 	      LABS:	 	  CARDIAC MARKERS ( 13 Sep 2020 00:47 )  x     / x     / x     / x     / x      p-BNP 13 Sep 2020 00:47: 127 pg/mL                          13.2   17.79 )-----------( 213      ( 14 Sep 2020 06:18 )             43.1     09-14    130<L>  |  96<L>  |  53.0<H>  ----------------------------<  103<H>  5.5<H>   |  21.0<L>  |  3.18<H>    Ca    9.0      14 Sep 2020 06:18  Mg     1.9     09-14    TPro  6.9  /  Alb  3.5  /  TBili  <0.2<L>  /  DBili  x   /  AST  15  /  ALT  12  /  AlkPhos  82  09-13    proBNP: Serum Pro-Brain Natriuretic Peptide: 127 pg/mL (09-13 @ 00:47)    Lipid Profile:   HgA1c:   TSH: Thyroid Stimulating Hormone, Serum: 1.86 uIU/mL        TELEMETRY: Reviewed    ECG:  Reviewed by me.
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
## 2020-09-14 NOTE — CONSULT NOTE ADULT - PROBLEM SELECTOR RECOMMENDATION 6
- patient with cough and brownish phlegm and fever for couple wks - unknown organism / fluid cultures pending ,

## 2020-09-15 LAB
24R-OH-CALCIDIOL SERPL-MCNC: 16 NG/ML — LOW (ref 30–80)
ALBUMIN SERPL ELPH-MCNC: 3.1 G/DL — LOW (ref 3.3–5.2)
ALP SERPL-CCNC: 107 U/L — SIGNIFICANT CHANGE UP (ref 40–120)
ALT FLD-CCNC: 36 U/L — HIGH
ANION GAP SERPL CALC-SCNC: 12 MMOL/L — SIGNIFICANT CHANGE UP (ref 5–17)
APPEARANCE UR: CLEAR — SIGNIFICANT CHANGE UP
AST SERPL-CCNC: 52 U/L — HIGH
BACTERIA # UR AUTO: NEGATIVE — SIGNIFICANT CHANGE UP
BILIRUB SERPL-MCNC: 0.3 MG/DL — LOW (ref 0.4–2)
BILIRUB UR-MCNC: NEGATIVE — SIGNIFICANT CHANGE UP
BUN SERPL-MCNC: 52 MG/DL — HIGH (ref 8–20)
CALCIUM SERPL-MCNC: 8.9 MG/DL — SIGNIFICANT CHANGE UP (ref 8.4–10.5)
CALCIUM SERPL-MCNC: 9.1 MG/DL — SIGNIFICANT CHANGE UP (ref 8.6–10.2)
CHLORIDE SERPL-SCNC: 96 MMOL/L — LOW (ref 98–107)
CO2 SERPL-SCNC: 28 MMOL/L — SIGNIFICANT CHANGE UP (ref 22–29)
COLOR SPEC: YELLOW — SIGNIFICANT CHANGE UP
CREAT SERPL-MCNC: 3.27 MG/DL — HIGH (ref 0.5–1.3)
DIFF PNL FLD: ABNORMAL
EPI CELLS # UR: SIGNIFICANT CHANGE UP
GLUCOSE SERPL-MCNC: 91 MG/DL — SIGNIFICANT CHANGE UP (ref 70–99)
GLUCOSE UR QL: NEGATIVE MG/DL — SIGNIFICANT CHANGE UP
HCT VFR BLD CALC: 42.6 % — SIGNIFICANT CHANGE UP (ref 34.5–45)
HGB BLD-MCNC: 13.1 G/DL — SIGNIFICANT CHANGE UP (ref 11.5–15.5)
KETONES UR-MCNC: NEGATIVE — SIGNIFICANT CHANGE UP
LEUKOCYTE ESTERASE UR-ACNC: ABNORMAL
MAGNESIUM SERPL-MCNC: 1.8 MG/DL — SIGNIFICANT CHANGE UP (ref 1.6–2.6)
MCHC RBC-ENTMCNC: 28.9 PG — SIGNIFICANT CHANGE UP (ref 27–34)
MCHC RBC-ENTMCNC: 30.8 GM/DL — LOW (ref 32–36)
MCV RBC AUTO: 94 FL — SIGNIFICANT CHANGE UP (ref 80–100)
NITRITE UR-MCNC: NEGATIVE — SIGNIFICANT CHANGE UP
OSMOLALITY UR: 287 MOSM/KG — LOW (ref 300–1000)
PH UR: 6 — SIGNIFICANT CHANGE UP (ref 5–8)
PHOSPHATE SERPL-MCNC: 4.8 MG/DL — HIGH (ref 2.4–4.7)
PLATELET # BLD AUTO: 261 K/UL — SIGNIFICANT CHANGE UP (ref 150–400)
POTASSIUM SERPL-MCNC: 4.8 MMOL/L — SIGNIFICANT CHANGE UP (ref 3.5–5.3)
POTASSIUM SERPL-SCNC: 4.8 MMOL/L — SIGNIFICANT CHANGE UP (ref 3.5–5.3)
POTASSIUM UR-SCNC: 16 MMOL/L — SIGNIFICANT CHANGE UP
PROT ?TM UR-MCNC: 166 MG/DL — HIGH (ref 0–12)
PROT SERPL-MCNC: 6.9 G/DL — SIGNIFICANT CHANGE UP (ref 6.6–8.7)
PROT UR-MCNC: 500 MG/DL
PTH-INTACT FLD-MCNC: 225 PG/ML — HIGH (ref 15–65)
RBC # BLD: 4.53 M/UL — SIGNIFICANT CHANGE UP (ref 3.8–5.2)
RBC # FLD: 17.3 % — HIGH (ref 10.3–14.5)
RBC CASTS # UR COMP ASSIST: SIGNIFICANT CHANGE UP /HPF (ref 0–4)
SODIUM SERPL-SCNC: 136 MMOL/L — SIGNIFICANT CHANGE UP (ref 135–145)
SODIUM UR-SCNC: 45 MMOL/L — SIGNIFICANT CHANGE UP
SP GR SPEC: 1.01 — SIGNIFICANT CHANGE UP (ref 1.01–1.02)
UROBILINOGEN FLD QL: NEGATIVE MG/DL — SIGNIFICANT CHANGE UP
WBC # BLD: 14.86 K/UL — HIGH (ref 3.8–10.5)
WBC # FLD AUTO: 14.86 K/UL — HIGH (ref 3.8–10.5)
WBC UR QL: SIGNIFICANT CHANGE UP

## 2020-09-15 PROCEDURE — 99233 SBSQ HOSP IP/OBS HIGH 50: CPT

## 2020-09-15 PROCEDURE — 99024 POSTOP FOLLOW-UP VISIT: CPT

## 2020-09-15 PROCEDURE — 71045 X-RAY EXAM CHEST 1 VIEW: CPT | Mod: 26

## 2020-09-15 PROCEDURE — 93308 TTE F-UP OR LMTD: CPT | Mod: 26

## 2020-09-15 RX ORDER — METOPROLOL TARTRATE 50 MG
25 TABLET ORAL
Refills: 0 | Status: DISCONTINUED | OUTPATIENT
Start: 2020-09-15 | End: 2020-09-20

## 2020-09-15 RX ORDER — QUETIAPINE FUMARATE 200 MG/1
100 TABLET, FILM COATED ORAL ONCE
Refills: 0 | Status: COMPLETED | OUTPATIENT
Start: 2020-09-15 | End: 2020-09-15

## 2020-09-15 RX ADMIN — OXYCODONE HYDROCHLORIDE 10 MILLIGRAM(S): 5 TABLET ORAL at 22:00

## 2020-09-15 RX ADMIN — CEFTRIAXONE 100 MILLIGRAM(S): 500 INJECTION, POWDER, FOR SOLUTION INTRAMUSCULAR; INTRAVENOUS at 18:50

## 2020-09-15 RX ADMIN — OXYCODONE HYDROCHLORIDE 5 MILLIGRAM(S): 5 TABLET ORAL at 15:02

## 2020-09-15 RX ADMIN — PANTOPRAZOLE SODIUM 40 MILLIGRAM(S): 20 TABLET, DELAYED RELEASE ORAL at 05:09

## 2020-09-15 RX ADMIN — SODIUM CHLORIDE 3 MILLILITER(S): 9 INJECTION INTRAMUSCULAR; INTRAVENOUS; SUBCUTANEOUS at 04:58

## 2020-09-15 RX ADMIN — Medication 25 MILLIGRAM(S): at 18:11

## 2020-09-15 RX ADMIN — OXYCODONE HYDROCHLORIDE 10 MILLIGRAM(S): 5 TABLET ORAL at 21:00

## 2020-09-15 RX ADMIN — SODIUM CHLORIDE 3 MILLILITER(S): 9 INJECTION INTRAMUSCULAR; INTRAVENOUS; SUBCUTANEOUS at 21:04

## 2020-09-15 RX ADMIN — OXYCODONE HYDROCHLORIDE 10 MILLIGRAM(S): 5 TABLET ORAL at 00:21

## 2020-09-15 RX ADMIN — MIRTAZAPINE 15 MILLIGRAM(S): 45 TABLET, ORALLY DISINTEGRATING ORAL at 21:00

## 2020-09-15 RX ADMIN — Medication 1 TABLET(S): at 05:09

## 2020-09-15 RX ADMIN — Medication 1 TABLET(S): at 18:11

## 2020-09-15 RX ADMIN — Medication 1 SPRAY(S): at 18:12

## 2020-09-15 RX ADMIN — QUETIAPINE FUMARATE 100 MILLIGRAM(S): 200 TABLET, FILM COATED ORAL at 21:55

## 2020-09-15 RX ADMIN — AMLODIPINE BESYLATE 10 MILLIGRAM(S): 2.5 TABLET ORAL at 05:09

## 2020-09-15 RX ADMIN — ALBUTEROL 2 PUFF(S): 90 AEROSOL, METERED ORAL at 08:33

## 2020-09-15 RX ADMIN — Medication 110 MILLIGRAM(S): at 05:08

## 2020-09-15 RX ADMIN — Medication 5 MILLIGRAM(S): at 21:01

## 2020-09-15 RX ADMIN — OXYCODONE HYDROCHLORIDE 5 MILLIGRAM(S): 5 TABLET ORAL at 07:08

## 2020-09-15 RX ADMIN — ALBUTEROL 2 PUFF(S): 90 AEROSOL, METERED ORAL at 20:38

## 2020-09-15 RX ADMIN — ATORVASTATIN CALCIUM 40 MILLIGRAM(S): 80 TABLET, FILM COATED ORAL at 21:00

## 2020-09-15 RX ADMIN — Medication 110 MILLIGRAM(S): at 18:11

## 2020-09-15 RX ADMIN — ALBUTEROL 2 PUFF(S): 90 AEROSOL, METERED ORAL at 03:38

## 2020-09-15 RX ADMIN — ALBUTEROL 2 PUFF(S): 90 AEROSOL, METERED ORAL at 15:22
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
## 2020-09-15 NOTE — PROGRESS NOTE ADULT - ATTENDING COMMENTS
Echocardiogram reviewed and shows no evidence of pericardial effusion and would opt to have drain removed prior to doing this would speak to CT surgery and make interventionalist aware.   The longer drain is in this is a nidus for infection     Start Lopressor 25mg po q 12hrs for SVT and continue telemetry monitoring; would repeat echocardiogram to assess for constriction (when HR is better controlled)     will follow up tomorrow

## 2020-09-15 NOTE — PROGRESS NOTE ADULT - SUBJECTIVE AND OBJECTIVE BOX
Subjective: Patient lying in bed, no acute distress noted, stated "I am feeling better" denies chest pain, palpitation, dizziness, headache, shortness of breath, abdominal pain, N/V/D.      VITAL SIGNS  Vital Signs Last 24 Hrs  T(C): 36.8 (20 @ 21:45), Max: 37.3 (20 @ 15:18)  T(F): 98.2 (20 @ 21:45), Max: 99.2 (20 @ 15:18)  HR: 101 (09-15-20 @ 03:38) (101 - 115)  BP: 147/97 (20 @ 23:06) (116/81 - 147/97)  RR: 16 (20 @ 23:06) (16 - 24)  SpO2: 96% (09-15-20 @ 03:38) (92% - 100%)  on 2L O2        MEDICATIONS  acetaminophen   Tablet .. 650 milliGRAM(s) Oral every 6 hours PRN  ALBUTerol    90 MICROgram(s) HFA Inhaler 2 Puff(s) Inhalation every 6 hours  amLODIPine   Tablet 10 milliGRAM(s) Oral daily  atorvastatin 40 milliGRAM(s) Oral at bedtime  cefTRIAXone   IVPB 2000 milliGRAM(s) IV Intermittent every 24 hours  doxycycline IVPB 100 milliGRAM(s) IV Intermittent every 12 hours  ergocalciferol 58429 Unit(s) Oral <User Schedule>  fluticasone propionate 50 MICROgram(s)/spray Nasal Spray 1 Spray(s) Both Nostrils two times a day  influenza   Vaccine 0.5 milliLiter(s) IntraMuscular once  lactobacillus acidophilus 1 Tablet(s) Oral two times a day  melatonin 5 milliGRAM(s) Oral at bedtime PRN  mirtazapine Soltab 15 milliGRAM(s) Oral at bedtime  nicotine - 21 mG/24Hr(s) Patch 1 patch Transdermal daily  oxyCODONE    IR 5 milliGRAM(s) Oral every 6 hours PRN  oxyCODONE    IR 10 milliGRAM(s) Oral every 6 hours PRN  pantoprazole    Tablet 40 milliGRAM(s) Oral before breakfast  QUEtiapine 300 milliGRAM(s) Oral daily  sodium bicarbonate  Infusion 0.046 mEq/kG/Hr IV Continuous <Continuous>  sodium chloride 0.9% lock flush 3 milliLiter(s) IV Push every 8 hours      PHYSICAL EXAM  General: well nourished, well developed, no acute distress  Neurology: alert and oriented x 3, nonfocal, no gross deficits  Respiratory: clear breath sounds, diminished at the bases bilaterally  CV: regular rate and rhythm, normal S1, S2  Abdomen: soft, nontender, nondistended, positive bowel sounds  Extremities: warm, well perfused. no edema. + DP pulses bilaterally  Incision/Drain: Pericardial drain to  bedside bag         @ 07:  -   @ 07:00  --------------------------------------------------------  IN: 250 mL / OUT: 2905 mL / NET: -2655 mL     @ 07:  -  09-15 @ 04:45  --------------------------------------------------------  IN: 850 mL / OUT: 665 mL / NET: 185 mL        Weights:  Daily     Daily Weight in k.4 (14 Sep 2020 05:04)  Admit Wt: Drug Dosing Weight  Height (cm): 165.1 (13 Sep 2020 00:15)  Weight (kg): 96.9 (13 Sep 2020 00:15)  BMI (kg/m2): 35.5 (13 Sep 2020 00:15)  BSA (m2): 2.03 (13 Sep 2020 00:15)    All laboratory results, radiology and medications reviewed.    LABS      134<L>  |  96<L>  |  52.0<H>  ----------------------------<  97  4.7   |  27.0  |  3.17<H>    Ca    9.1      14 Sep 2020 15:48  Mg     1.9                                        13.2   17.79 )-----------( 213      ( 14 Sep 2020 06:18 )             43.1              CAPILLARY BLOOD GLUCOSE      POCT Blood Glucose.: 107 mg/dL (14 Sep 2020 21:34)    < from: CT Chest No Cont (20 @ 01:35) >    IMPRESSION:    Large pericardial effusion.    Right upper extremity catheter terminates in the subclavian region.      < end of copied text >    < from: TTE Echo Limited or F/U (20 @ 13:27) >    Summary:   1. Smallpericardial effusion.   2. S/p Pericardiocentesis. Initial images showed larged pericardial effusion. s/p pericardiocentesis. Final images shows small effusion.    < end of copied text >  < from: US Kidney and Bladder (20 @ 14:03) >    Right kidney:  11 cm. Increased echogenicity of the renal parenchyma with parenchymal thinning compatible with medical renal disease. Upper pole cyst measuring 1.6 cm. Trace perinephric fluid. No calculus. No hydronephrosis.    Left kidney:  12 cm. Increased echogenicity of the renal parenchyma with parenchymal thinning compatible with medical renal disease. Left upper pole cyst measuring 1.9 cm. No calculus. No hydronephrosis.    Urinary bladder: Not well distended.    IMPRESSION:    Findings compatible medical renal disease. No hydronephrosis.      < end of copied text >          PAST MEDICAL & SURGICAL HISTORY:  Current smoker    CKD (chronic kidney disease)    HTN (hypertension)    COPD, mild

## 2020-09-15 NOTE — PHYSICAL THERAPY INITIAL EVALUATION ADULT - GENERAL OBSERVATIONS, REHAB EVAL
Pt received in chair, + IV Loc, +drain, + NC O2, + tele, in NAD, in 9/10 pain, but agreeable to PT evaluation

## 2020-09-15 NOTE — PHYSICAL THERAPY INITIAL EVALUATION ADULT - ADDITIONAL COMMENTS
Pt reports living in a private home with 3 DIANE with bilateral handrails and no stairs inside. Pt reports being independent prior to admission,  assist if needed with whatever it may be. Pts  does work unavailable to assist 24/7 if needed. Pt owns no DME, reporting she needs Bipap for at night, home oxygen and a nebulizer. Pt independent prior to admission. Does not drive/work.

## 2020-09-15 NOTE — PROGRESS NOTE ADULT - PROBLEM SELECTOR PLAN 4
Nephro following   Will monitor renal function, labs, urine output closely  Renal US as reported above

## 2020-09-15 NOTE — PHYSICAL THERAPY INITIAL EVALUATION ADULT - GAIT DISTANCE, PT EVAL
standing rest breaks prn and to assess vitals. Pts -110s, unable to get accurate SpO2 reading despite multiple attempts. RN made aware. Pt denies SOB./200 feet

## 2020-09-15 NOTE — PHYSICAL THERAPY INITIAL EVALUATION ADULT - PERTINENT HX OF CURRENT PROBLEM, REHAB EVAL
presented to [facility] on 9/10/2020 with c/o SOLO. Now s/p pericardiocentesis for large pericardial effusion.

## 2020-09-15 NOTE — PROGRESS NOTE ADULT - SUBJECTIVE AND OBJECTIVE BOX
Patient seen and examined . Comfortable , no complaints , on O2 3 lit , no cough , no sob .     CC : sob / cough - resolved     MEDICATIONS  (STANDING):  ALBUTerol    90 MICROgram(s) HFA Inhaler 2 Puff(s) Inhalation every 6 hours  amLODIPine   Tablet 10 milliGRAM(s) Oral daily  atorvastatin 40 milliGRAM(s) Oral at bedtime  cefTRIAXone   IVPB 2000 milliGRAM(s) IV Intermittent every 24 hours  doxycycline IVPB 100 milliGRAM(s) IV Intermittent every 12 hours  ergocalciferol 40296 Unit(s) Oral <User Schedule>  fluticasone propionate 50 MICROgram(s)/spray Nasal Spray 1 Spray(s) Both Nostrils two times a day  influenza   Vaccine 0.5 milliLiter(s) IntraMuscular once  lactobacillus acidophilus 1 Tablet(s) Oral two times a day  metoprolol tartrate 25 milliGRAM(s) Oral two times a day  mirtazapine Soltab 15 milliGRAM(s) Oral at bedtime  nicotine - 21 mG/24Hr(s) Patch 1 patch Transdermal daily  pantoprazole    Tablet 40 milliGRAM(s) Oral before breakfast  QUEtiapine 300 milliGRAM(s) Oral daily  sodium bicarbonate  Infusion 0.046 mEq/kG/Hr (60 mL/Hr) IV Continuous <Continuous>  sodium chloride 0.9% lock flush 3 milliLiter(s) IV Push every 8 hours    MEDICATIONS  (PRN):  acetaminophen   Tablet .. 650 milliGRAM(s) Oral every 6 hours PRN Mild Pain (1 - 3)  melatonin 5 milliGRAM(s) Oral at bedtime PRN Insomnia  oxyCODONE    IR 5 milliGRAM(s) Oral every 6 hours PRN Moderate Pain (4 - 6)  oxyCODONE    IR 10 milliGRAM(s) Oral every 6 hours PRN Severe Pain (7 - 10)      LABS:                          13.1   14.86 )-----------( 261      ( 15 Sep 2020 06:26 )             42.6     09-15    136  |  96<L>  |  52.0<H>  ----------------------------<  91  4.8   |  28.0  |  3.27<H>    Ca    9.1      15 Sep 2020 06:26  Phos  4.8     09-15  Mg     1.8     09-15    TPro  6.9  /  Alb  3.1<L>  /  TBili  0.3<L>  /  DBili  x   /  AST  52<H>  /  ALT  36<H>  /  AlkPhos  107  09-15      Urinalysis Basic - ( 15 Sep 2020 06:57 )    Color: Yellow / Appearance: Clear / S.010 / pH: x  Gluc: x / Ketone: Negative  / Bili: Negative / Urobili: Negative mg/dL   Blood: x / Protein: 500 mg/dL / Nitrite: Negative   Leuk Esterase: Trace / RBC: 0-2 /HPF / WBC 3-5   Sq Epi: x / Non Sq Epi: Occasional / Bacteria: Negative    RADIOLOGY & ADDITIONAL TESTS:    < from: Xray Chest 1 View- PORTABLE-Routine (Xray Chest 1 View- PORTABLE-Routine in AM.) (09.15.20 @ 07:40) >     EXAM:  XR CHEST PORTABLE ROUTINE 1V                          PROCEDURE DATE:  09/15/2020          INTERPRETATION:  TECHNIQUE: Single portable view of the chest.    COMPARISON: 2020    CLINICAL HISTORY: eval lungscap, pericardial effusion s/p drainage    FINDINGS:    Single frontal view of the chest demonstrates mild to moderate CHF. Small bilateral pleural effusions. Right axillary midline catheter. The cardiomediastinal silhouette is enlarged. No acute osseous abnormalities. Overlying EKG leads and wires are noted. Left lower lobe pigtail catheter.    IMPRESSION: No interval change.      KRISTAN NOBLES M.D., ATTENDING RADIOLOGIST  This document has been electronically signed. Sep 15 2020  9:37AM    < end of copied text >    < from: US Kidney and Bladder (20 @ 14:03) >     EXAM:  US KIDNEYS AND BLADDER                          PROCEDURE DATE:  2020          INTERPRETATION:  CLINICAL INFORMATION: Chronic renal disease    COMPARISON: None available.    TECHNIQUE: Sonography of the kidneys and bladder.    FINDINGS:    Right kidney:  11 cm. Increased echogenicity of the renal parenchyma with parenchymal thinning compatible with medical renal disease. Upper pole cyst measuring 1.6 cm. Trace perinephric fluid. No calculus. No hydronephrosis.    Left kidney:  12 cm. Increased echogenicity of the renal parenchyma with parenchymal thinning compatible with medical renal disease. Left upper pole cyst measuring 1.9 cm. No calculus. No hydronephrosis.    Urinary bladder: Not well distended.    IMPRESSION:    Findings compatible medical renal disease. No hydronephrosis.    ANITA LEONE M.D., ATTENDING RADIOLOGIST  This document has been electronically signed. Sep 14 2020  3:20PM    < end of copied text >    < from: CT Chest No Cont (20 @ 01:35) >     EXAM:  CT CHEST                          PROCEDURE DATE:  2020          INTERPRETATION:  CLINICAL INFORMATION: Pericardial effusion.    COMPARISON: None.    PROCEDURE:  CT of the Chest was performed without intravenous contrast.  Sagittal and coronal reformats were performed.    FINDINGS:    LUNGS AND AIRWAYS: Patent central airways.  Mild paraseptal emphysema within the right lung apex. Scattered subsegmental atelectasis.  PLEURA: Calcification along the left diaphragmatic surface and left posterior pleura. No pleural effusion.  MEDIASTINUM AND NOMI: No lymphadenopathy.  VESSELS: Within normal limits. Right-sided catheter terminates in the subclavian region.  HEART: Heart size is normal. Large pericardial effusion measuring up to 3.5 cm in greatest transverse diameter.  CHEST WALL AND LOWER NECK: Within normal limits.  VISUALIZED UPPER ABDOMEN: Hypodensities within the bilateral kidneys may represent renal cysts, however incompletely characterized on this noncontrast examination.  BONES: Multilevel degenerative changes of the spine.    IMPRESSION:    Large pericardial effusion.    Right upper extremity catheter terminates in the subclavian region.      KARINA GARIBAY M.D., ATTENDING RADIOLOGIST  This document has beenelectronically signed. Sep 13 2020  1:52AM    < end of copied text >    < from: TTE Echo Limited or F/U (09.15.20 @ 10:34) >    EXAM:  ECHO TRANSTHORACIC;F U OR LTD      PROCEDURE DATE:  Sep 15 2020   .      INTERPRETATION:  REPORT:  TRANSTHORACIC ECHOCARDIOGRAM REPORT        Patient Name:   PANCHITO MONAHAN Patient Location: Inpatient  Medical Rec #:  [id number]        Accession #:      [id number]  Account #:                      Height:           65.0 in 165.0 cm  YOB: 1965       Weight:           213.6 lb 96.90 kg  Patient Age:    55 years        BSA:              2.03 m²  Patient Gender: F               BP:               118/83 mmHg      Date of Exam:        9/15/2020 10:34:46 AM  Sonographer:         Cheyenne Bell  Referring Physician: Ray MARTIN    Procedure:     Follow up or Limited Echocardiogram.  Indications:   Pericardial effusion (noninflammatory) - I31.3  Diagnosis:     Pericardial effusion (noninflammatory) - I31.3  Study Details: Technically difficult study. Study quality was adversely affected                 due to body habitus, patient obesity and post-op                 dressings/bandaging.        2D AND M-MODE MEASUREMENTS (normal ranges within parentheses):  Left Ventricle:                  Normal  IVSd (2D):              1.14 cm (0.7-1.1)  LVPWd (2D):             1.38 cm (0.7-1.1)  LVIDd (2D):             4.13 cm (3.4-5.7)  LVIDs (2D):             1.98 cm  LV FS (2D):             52.1 %   (>25%)  Relative Wall Thickness  0.67    (<0.42)    SPECTRAL DOPPLER ANALYSIS (where applicable):    PHYSICIAN INTERPRETATION:  Left Ventricle: The left ventricular internal cavity size is normal. Left ventricular wall thickness is mildly increased.  Global LV systolic function was hyperdynamic. Left ventricular ejection fraction, by visual estimation, is >75%.  Right Ventricle: Normal right ventricular size and function.  Pericardium: A small pericardial effusion is present. The pericardial effusion is posterior to the left ventricle , anterior to the right ventricle and localized near the left ventricle. The pericardial effusion appears to contain mixed echogenic material. S/P pericardiocentesis. Pericardial drain still in place as per clinical history.  Tricuspid Valve: Trivial tricuspid regurgitation is visualized. Adequate TR velocity was not obtained to accurately assess RVSP.  Venous: The inferior vena cava is not well visualized.      Summary:   1. S/P pericardiocentesis. Pericardial drain still in place as per clinical history.   2. Hyperdynamic wall motion.   3. Left ventricular ejection fraction, by visual estimation, is >75%.   4. Normal right ventricular size and function.   5. Small pericardial effusion. Small localized loculated LV posterior . No echo evidence of tamponade.   6. Tissue Doppler not obtained. Respiratory variation and paradoxical septal motion . May suggest constriction. repeat study dedicated study to evaluate for contriction if clinically indicated    MD Dk, RPVI Electronically signed on 9/15/2020 at 2:44:34 PM      JUAN JOSE FRIEDMAN M.D., ATTENDING CARDIOLOGY  This document has been electronically signed. Sep 15 2020 10:34AM    < end of copied text >      REVIEW OF SYSTEMS:    Comfortable at present , denies sob , cough , all systems are reviewed and are negative .     Vital Signs Last 24 Hrs  T(C): 37.6 (15 Sep 2020 16:26), Max: 37.6 (15 Sep 2020 16:26)  T(F): 99.6 (15 Sep 2020 16:26), Max: 99.6 (15 Sep 2020 16:)  HR: 102 (15 Sep 2020 16:26) (101 - 116)  BP: 110/75 (15 Sep 2020 16:26) (110/75 - 147/97)  BP(mean): --  RR: 19 (15 Sep 2020 16:26) (16 - 20)  SpO2: 93% (15 Sep 2020 16:26) (92% - 96%)    PHYSICAL EXAM:    GENERAL: NAD, well-groomed, well-developed  HEAD:  Atraumatic, Normocephalic  EYES: EOMI, PERRLA, conjunctiva and sclera clear  NECK: Supple, No JVD, Normal thyroid  NERVOUS SYSTEM:  Alert & Oriented X3, no focal deficit  CHEST/LUNG:  b/l decreased bs  ,  no  rales, rhonchi, wheezing, or rubs  HEART: Regular rate and rhythm; No murmurs, rubs, or gallops  ABDOMEN: Soft, Nontender, Nondistended; Bowel sounds present  EXTREMITIES:  2+ Peripheral Pulses, No clubbing, cyanosis, or edema  LYMPH: No lymphadenopathy noted  SKIN: No rashes or lesions  below sternum drain +

## 2020-09-15 NOTE — PROGRESS NOTE ADULT - PROBLEM SELECTOR PLAN 1
Now s/p pericardiocentesis on 9/13  Currently hemodynamically stable  Monitor drain every shift  CBC in AM
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
## 2020-09-16 DIAGNOSIS — J18.9 PNEUMONIA, UNSPECIFIED ORGANISM: ICD-10-CM

## 2020-09-16 LAB
ANION GAP SERPL CALC-SCNC: 11 MMOL/L — SIGNIFICANT CHANGE UP (ref 5–17)
BUN SERPL-MCNC: 58 MG/DL — HIGH (ref 8–20)
CALCIUM SERPL-MCNC: 8.9 MG/DL — SIGNIFICANT CHANGE UP (ref 8.6–10.2)
CHLORIDE SERPL-SCNC: 97 MMOL/L — LOW (ref 98–107)
CO2 SERPL-SCNC: 25 MMOL/L — SIGNIFICANT CHANGE UP (ref 22–29)
CREAT SERPL-MCNC: 3.52 MG/DL — HIGH (ref 0.5–1.3)
GLUCOSE SERPL-MCNC: 111 MG/DL — HIGH (ref 70–99)
HCT VFR BLD CALC: 39.8 % — SIGNIFICANT CHANGE UP (ref 34.5–45)
HGB BLD-MCNC: 12 G/DL — SIGNIFICANT CHANGE UP (ref 11.5–15.5)
MAGNESIUM SERPL-MCNC: 1.8 MG/DL — SIGNIFICANT CHANGE UP (ref 1.6–2.6)
MCHC RBC-ENTMCNC: 29 PG — SIGNIFICANT CHANGE UP (ref 27–34)
MCHC RBC-ENTMCNC: 30.2 GM/DL — LOW (ref 32–36)
MCV RBC AUTO: 96.1 FL — SIGNIFICANT CHANGE UP (ref 80–100)
PLATELET # BLD AUTO: 240 K/UL — SIGNIFICANT CHANGE UP (ref 150–400)
POTASSIUM SERPL-MCNC: 5.1 MMOL/L — SIGNIFICANT CHANGE UP (ref 3.5–5.3)
POTASSIUM SERPL-SCNC: 5.1 MMOL/L — SIGNIFICANT CHANGE UP (ref 3.5–5.3)
RBC # BLD: 4.14 M/UL — SIGNIFICANT CHANGE UP (ref 3.8–5.2)
RBC # FLD: 17.3 % — HIGH (ref 10.3–14.5)
SODIUM SERPL-SCNC: 133 MMOL/L — LOW (ref 135–145)
WBC # BLD: 12.61 K/UL — HIGH (ref 3.8–10.5)
WBC # FLD AUTO: 12.61 K/UL — HIGH (ref 3.8–10.5)

## 2020-09-16 PROCEDURE — 71045 X-RAY EXAM CHEST 1 VIEW: CPT | Mod: 26

## 2020-09-16 PROCEDURE — 99233 SBSQ HOSP IP/OBS HIGH 50: CPT

## 2020-09-16 PROCEDURE — 99223 1ST HOSP IP/OBS HIGH 75: CPT

## 2020-09-16 PROCEDURE — 71045 X-RAY EXAM CHEST 1 VIEW: CPT | Mod: 26,77

## 2020-09-16 RX ADMIN — Medication 25 MILLIGRAM(S): at 17:54

## 2020-09-16 RX ADMIN — Medication 25 MILLIGRAM(S): at 05:00
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
## 2020-09-16 NOTE — CONSULT NOTE ADULT - REASON FOR ADMISSION
Transfer from [facility] with pericardial effusion.
Transfer from [facility] with pericardial effusion.
Transfer from [facility] with pericardial effusion.
Transfer from [facility] with pericardial effusion.
Transfer from [facility] with pericardial effusion.
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
## 2020-09-16 NOTE — PROGRESS NOTE ADULT - SUBJECTIVE AND OBJECTIVE BOX
PANCHITO MONAHAN    [id number]    55y      Female    CC: admitted with SOB, fever, cough   feels much better now   has not been ambulating - getting weaned off O2 4lit-->2 lit     INTERVAL HPI/OVERNIGHT EVENTS: no acute events     REVIEW OF SYSTEMS:    CONSTITUTIONAL: No fever, fatigue  RESPIRATORY: No cough, wheezing No shortness of breath  CARDIOVASCULAR: No chest pain, palpitations  GASTROINTESTINAL: No abdominal or epigastric pain. No nausea, vomiting        Vital Signs Last 24 Hrs  T(C): 37.1 (16 Sep 2020 11:00), Max: 37.6 (15 Sep 2020 16:)  T(F): 98.8 (16 Sep 2020 11:00), Max: 99.6 (15 Sep 2020 16:26)  HR: 90 (16 Sep 2020 11:00) (90 - 102)  BP: 117/78 (16 Sep 2020 11:00) (108/77 - 120/86)  BP(mean): --  RR: 18 (16 Sep 2020 11:) (18 - 19)  SpO2: 91% (16 Sep 2020 11:) (91% - 100%)    PHYSICAL EXAM:    GENERAL: NAD, well-groomed, obese  HEENT: PERRL, +EOMI  NECK: soft, Supple, No JVD  CHEST/LUNG: Clear to percussion bilaterally; Left basal crackles+  HEART: S1S2+, Regular rate and rhythm; No murmur  ABDOMEN: Soft, Nontender, distended; Bowel sounds present  EXTREMITIES: No clubbing, cyanosis, or edema  SKIN: No rashes or lesions  NEURO: AAOX3      09-15 @ : @ 07:00  --------------------------------------------------------  IN: 630 mL / OUT: 7.5 mL / NET: 622.5 mL     @ 07: @ 15:10  --------------------------------------------------------  IN: 560 mL / OUT: 800 mL / NET: -240 mL        LABS:                        12.0   12.61 )-----------( 240      ( 16 Sep 2020 06:31 )             39.8         133<L>  |  97<L>  |  58.0<H>  ----------------------------<  111<H>  5.1   |  25.0  |  3.52<H>    Ca    8.9      16 Sep 2020 06:31  Phos  4.8     09-15  Mg     1.8         TPro  6.9  /  Alb  3.1<L>  /  TBili  0.3<L>  /  DBili  x   /  AST  52<H>  /  ALT  36<H>  /  AlkPhos  107  09-15      Urinalysis Basic - ( 15 Sep 2020 06:57 )    Color: Yellow / Appearance: Clear / S.010 / pH: x  Gluc: x / Ketone: Negative  / Bili: Negative / Urobili: Negative mg/dL   Blood: x / Protein: 500 mg/dL / Nitrite: Negative   Leuk Esterase: Trace / RBC: 0-2 /HPF / WBC 3-5   Sq Epi: x / Non Sq Epi: Occasional / Bacteria: Negative          MEDICATIONS  (STANDING):  ALBUTerol    90 MICROgram(s) HFA Inhaler 2 Puff(s) Inhalation every 6 hours  amLODIPine   Tablet 10 milliGRAM(s) Oral daily  atorvastatin 40 milliGRAM(s) Oral at bedtime  cefTRIAXone   IVPB 2000 milliGRAM(s) IV Intermittent every 24 hours  doxycycline IVPB 100 milliGRAM(s) IV Intermittent every 12 hours  ergocalciferol 96250 Unit(s) Oral <User Schedule>  fluticasone propionate 50 MICROgram(s)/spray Nasal Spray 1 Spray(s) Both Nostrils two times a day  influenza   Vaccine 0.5 milliLiter(s) IntraMuscular once  lactobacillus acidophilus 1 Tablet(s) Oral two times a day  metoprolol tartrate 25 milliGRAM(s) Oral two times a day  mirtazapine Soltab 15 milliGRAM(s) Oral at bedtime  nicotine - 21 mG/24Hr(s) Patch 1 patch Transdermal daily  pantoprazole    Tablet 40 milliGRAM(s) Oral before breakfast  QUEtiapine 300 milliGRAM(s) Oral daily  sodium chloride 0.9% lock flush 3 milliLiter(s) IV Push every 8 hours    MEDICATIONS  (PRN):  acetaminophen   Tablet .. 650 milliGRAM(s) Oral every 6 hours PRN Mild Pain (1 - 3)  melatonin 5 milliGRAM(s) Oral at bedtime PRN Insomnia  oxyCODONE    IR 5 milliGRAM(s) Oral every 6 hours PRN Moderate Pain (4 - 6)  oxyCODONE    IR 10 milliGRAM(s) Oral every 6 hours PRN Severe Pain (7 - 10)      RADIOLOGY & ADDITIONAL TESTS:

## 2020-09-16 NOTE — PROGRESS NOTE ADULT - SUBJECTIVE AND OBJECTIVE BOX
[facility]                                                               Office: [address] [address]                                                              Telephone: [phone]. Fax:[phone]                                                                             PROGRESS NOTE  Reason for follow up: Large pericardial effusion  Overnight: No new events.   Update: s/p pericardial drain removal     Subjective: "  _____doing well _________________"      	  Vitals:  T(C): 36.9 (09-16-20 @ 15:45), Max: 37.1 (09-16-20 @ 11:00)  HR: 105 (09-16-20 @ 15:45) (90 - 105)  BP: 101/68 (09-16-20 @ 15:45) (101/68 - 120/86)  RR: 18 (09-16-20 @ 11:00) (18 - 18)  SpO2: 92% (09-16-20 @ 15:45) (91% - 100%)  Wt(kg): --  I&O's Summary    15 Sep 2020 07:01  -  16 Sep 2020 07:00  --------------------------------------------------------  IN: 630 mL / OUT: 7.5 mL / NET: 622.5 mL    16 Sep 2020 07:01  -  16 Sep 2020 17:14  --------------------------------------------------------  IN: 560 mL / OUT: 800 mL / NET: -240 mL      Weight (kg): 96.9 (09-13 @ 00:15)      PHYSICAL EXAM:  Appearance: Comfortable. No acute distress  HEENT:  Head and neck: Atraumatic. Normocephalic.  Normal oral mucosa, PERRL, Neck is supple. No JVD, No carotid bruit.   Neurologic: A & O x 3, no focal deficits. EOMI.  Lymphatic: No cervical lymphadenopathy  Cardiovascular: Normal S1 S2, tachycardic , No murmur, rubs/gallops. No JVD, No edema  Respiratory: Lungs clear to auscultation  Gastrointestinal:  Soft, Non-tender, + BS  Lower Extremities: No edema  Psychiatry: Patient is calm. No agitation. Mood & affect appropriate  Skin: No rashes/ ecchymoses/cyanosis/ulcers visualized on the face, hands or feet.      CURRENT MEDICATIONS:  amLODIPine   Tablet 10 milliGRAM(s) Oral daily  metoprolol tartrate 25 milliGRAM(s) Oral two times a day    ALBUTerol    90 MICROgram(s) HFA Inhaler  cefTRIAXone   IVPB  doxycycline IVPB  mirtazapine Soltab  QUEtiapine  pantoprazole    Tablet  atorvastatin  ergocalciferol  fluticasone propionate 50 MICROgram(s)/spray Nasal Spray  influenza   Vaccine  sodium chloride 0.9% lock flush      DIAGNOSTIC TESTING:  [x ] Echocardiogram:   < from: TTE Echo Limited or F/U (09.15.20 @ 10:34) >  Summary:   1. S/P pericardiocentesis. Pericardial drain still in place as per clinical history.   2. Hyperdynamic wall motion.   3. Left ventricular ejection fraction, by visual estimation, is >75%.   4. Normal right ventricular size and function.   5. Small pericardial effusion. Small localized loculated LV posterior . No echo evidence of tamponade.   6. Tissue Doppler not obtained. Respiratory variation and paradoxical septal motion . May suggest constriction. repeat study dedicated study to evaluate for contriction if clinically indicated    MD Dk, RPVI Electronically signed on 9/15/2020 at 2:44:34 PM        < end of copied text >    [ ]  Catheterization:  [ ] Stress Test:    OTHER: 	      LABS:	 	                            12.0   12.61 )-----------( 240      ( 16 Sep 2020 06:31 )             39.8     09-16    133<L>  |  97<L>  |  58.0<H>  ----------------------------<  111<H>  5.1   |  25.0  |  3.52<H>    Ca    8.9      16 Sep 2020 06:31  Phos  4.8     09-15  Mg     1.8     09-16    TPro  6.9  /  Alb  3.1<L>  /  TBili  0.3<L>  /  DBili  x   /  AST  52<H>  /  ALT  36<H>  /  AlkPhos  107  09-15    proBNP: Serum Pro-Brain Natriuretic Peptide: 127 pg/mL (09-13 @ 00:47)    Lipid Profile:   HgA1c:   TSH: Thyroid Stimulating Hormone, Serum: 1.86 uIU/mL        TELEMETRY: Reviewed    ECG:  Reviewed by me.

## 2020-09-16 NOTE — PROGRESS NOTE ADULT - PROBLEM SELECTOR PLAN 1
Now s/p pericardiocentesis on 9/13  Currently hemodynamically stable  D/C pericardial drain, post removal chest xray ordered  Continue Tylenol PRN & Oxy IR for pain management  Discussed plan with Dr. Best

## 2020-09-16 NOTE — PROGRESS NOTE ADULT - PROBLEM SELECTOR PLAN 4
Renal following   BUN/Creat unchanged, trend renal function,   Continue to hold nephrotoxic medication
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
## 2020-09-16 NOTE — PROGRESS NOTE ADULT - SUBJECTIVE AND OBJECTIVE BOX
Subjective: "I didn't get much sleep last night."  Pt. lying in bed, denies any SOB or chest pain, NAD noted.    Tele: Sinus rhythm          T(C): 37.1 (09-16-20 @ 11:00), Max: 37.6 (09-15-20 @ 16:26)  HR: 90 (09-16-20 @ 11:00) (90 - 102)  BP: 117/78 (09-16-20 @ 11:00) (108/77 - 120/86)  RR: 18 (09-16-20 @ 11:00) (18 - 19)  SpO2: 91% (09-16-20 @ 11:00) (91% - 100%)          09-16    133<L>  |  97<L>  |  58.0<H>  ----------------------------<  111<H>  5.1   |  25.0  |  3.52<H>    Ca    8.9      16 Sep 2020 06:31  Phos  4.8     09-15  Mg     1.8     09-16    TPro  6.9  /  Alb  3.1<L>  /  TBili  0.3<L>  /  DBili  x   /  AST  52<H>  /  ALT  36<H>  /  AlkPhos  107  09-15                               12.0   12.61 )-----------( 240      ( 16 Sep 2020 06:31 )             39.8               CAPILLARY BLOOD GLUCOSE               CXR:< from: Xray Chest 1 View-PORTABLE IMMEDIATE (Xray Chest 1 View-PORTABLE IMMEDIATE .) (09.16.20 @ 10:19) >  EXAM:  XR CHEST PORTABLE IMMED 1V                          PROCEDURE DATE:  09/16/2020          INTERPRETATION:  AP chest on September 16, 2020 at 9:40 AM. Patient had removal of pericardial drain.    Heart is enlarged. Pericardial drain seen on September 16 earlier study has been removed.    Mild to moderate left base effusion with adjacent atelectasis and slight right base fluid again noted.    IMPRESSION: Uneventful removal of pericardial drain. Persistent findings particularly in the left lung.              KARINA FELICIANO M.D., ATTENDING RADIOLOGIST  This document has been electronically signed. Sep 16 2020 10:48AM    < end of copied text >    < from: CT Chest No Cont (09.13.20 @ 01:35) >  EXAM:  CT CHEST                          PROCEDURE DATE:  09/13/2020          INTERPRETATION:  CLINICAL INFORMATION: Pericardial effusion.    COMPARISON: None.    PROCEDURE:  CT of the Chest was performed without intravenous contrast.  Sagittal and coronal reformats were performed.    FINDINGS:    LUNGS AND AIRWAYS: Patent central airways.  Mild paraseptal emphysema within the right lung apex. Scattered subsegmental atelectasis.  PLEURA: Calcification along the left diaphragmatic surface and left posterior pleura. No pleural effusion.  MEDIASTINUM AND NOMI: No lymphadenopathy.  VESSELS: Within normal limits. Right-sided catheter terminates in the subclavian region.  HEART: Heart size is normal. Large pericardial effusion measuring up to 3.5 cm in greatest transverse diameter.  CHEST WALL AND LOWER NECK: Within normal limits.  VISUALIZED UPPER ABDOMEN: Hypodensities within the bilateral kidneys may represent renal cysts, however incompletely characterized on this noncontrast examination.  BONES: Multilevel degenerative changes of the spine.    IMPRESSION:    Large pericardial effusion.    Right upper extremity catheter terminates in the subclavian region.      KARINA GARIBAY M.D., ATTENDING RADIOLOGIST  This document has beenelectronically signed. Sep 13 2020  1:52AM    < end of copied text >      < from: TTE Echo Limited or F/U (09.15.20 @ 10:34) >  EXAM:  ECHO TRANSTHORACIC;F U OR LTD      PROCEDURE DATE:  Sep 15 2020   .      INTERPRETATION:  REPORT:  TRANSTHORACIC ECHOCARDIOGRAM REPORT        Patient Name:   PANCHITO MONAHAN Patient Location: [facility]  Medical Rec #:  [id number]        Accession #:      [id number]  Account #:                      Height:           65.0 in 165.0 cm  YOB: 1965       Weight:           213.6 lb 96.90 kg  Patient Age:    55 years        BSA:              2.03 m²  Patient Gender: F               BP:               118/83 mmHg      Date of Exam:        9/15/2020 10:34:46 AM  Sonographer:         Cheyenne Bell  Referring Physician: Ray MARTIN    Procedure:     Follow up or Limited Echocardiogram.  Indications:   Pericardial effusion (noninflammatory) - I31.3  Diagnosis:     Pericardial effusion (noninflammatory) - I31.3  Study Details: Technically difficult study. Study quality was adversely affected                 due to body habitus, patient obesity and post-op                 dressings/bandaging.        2D AND M-MODE MEASUREMENTS (normal ranges within parentheses):  Left Ventricle:                  Normal  IVSd (2D):              1.14 cm (0.7-1.1)  LVPWd (2D):             1.38 cm (0.7-1.1)  LVIDd (2D):             4.13 cm (3.4-5.7)  LVIDs (2D):             1.98 cm  LV FS (2D):             52.1 %   (>25%)  Relative Wall Thickness  0.67    (<0.42)    SPECTRAL DOPPLER ANALYSIS (where applicable):    PHYSICIAN INTERPRETATION:  Left Ventricle: The left ventricular internal cavity size is normal. Left ventricular wall thickness is mildly increased.  Global LV systolic function was hyperdynamic. Left ventricular ejection fraction, by visual estimation, is >75%.  Right Ventricle: Normal right ventricular size and function.  Pericardium: A small pericardial effusion is present. The pericardial effusion is posterior to the left ventricle , anterior to the right ventricle and localized near the left ventricle. The pericardial effusion appears to contain mixed echogenic material. S/P pericardiocentesis. Pericardial drain still in place as per clinical history.  Tricuspid Valve: Trivial tricuspid regurgitation is visualized. Adequate TR velocity was not obtained to accurately assess RVSP.  Venous: The inferior vena cava is not well visualized.      Summary:   1. S/P pericardiocentesis. Pericardial drain still in place as per clinical history.   2. Hyperdynamic wall motion.   3. Left ventricular ejection fraction, by visual estimation, is >75%.   4. Normal right ventricular size and function.   5. Small pericardial effusion. Small localized loculated LV posterior . No echo evidence of tamponade.   6. Tissue Doppler not obtained. Respiratory variation and paradoxical septal motion . May suggest constriction. repeat study dedicated study to evaluate for contriction if clinically indicated    MD Dk, RPVI Electronically signed on 9/15/2020 at 2:44:34 PM        *** Final ***        JUAN JOSE FRIEDMAN M.D., ATTENDING CARDIOLOGY  This document has been electronically signed. Sep 15 2020 10:34AM    < end of copied text >      < from: US Kidney and Bladder (09.14.20 @ 14:03) >  EXAM:  US KIDNEYS AND BLADDER                          PROCEDURE DATE:  09/14/2020          INTERPRETATION:  CLINICAL INFORMATION: Chronic renal disease    COMPARISON: None available.    TECHNIQUE: Sonography of the kidneys and bladder.    FINDINGS:    Right kidney:  11 cm. Increased echogenicity of the renal parenchyma with parenchymal thinning compatible with medical renal disease. Upper pole cyst measuring 1.6 cm. Trace perinephric fluid. No calculus. No hydronephrosis.    Left kidney:  12 cm. Increased echogenicity of the renal parenchyma with parenchymal thinning compatible with medical renal disease. Left upper pole cyst measuring 1.9 cm. No calculus. No hydronephrosis.    Urinary bladder: Not well distended.    IMPRESSION:    Findings compatible medical renal disease. No hydronephrosis.        ANITA LEONE M.D., ATTENDING RADIOLOGIST  This document has been electronically signed. Sep 14 2020  3:20PM    < end of copied text >    I&O's Detail    15 Sep 2020 07:01  -  16 Sep 2020 07:00  --------------------------------------------------------  IN:    IV PiggyBack: 150 mL    Oral Fluid: 480 mL  Total IN: 630 mL    OUT:    Drain (mL): 7.5 mL  Total OUT: 7.5 mL    Total NET: 622.5 mL      BOWEL MOVEMENT:  [x] YES [ ] NO        Drug Dosing Weight  Height (cm): 165.1 (13 Sep 2020 00:15)  Weight (kg): 96.9 (13 Sep 2020 00:15)  BMI (kg/m2): 35.5 (13 Sep 2020 00:15)  BSA (m2): 2.03 (13 Sep 2020 00:15)    ALBUTerol    90 MICROgram(s) HFA Inhaler 2 Puff(s) Inhalation every 6 hours  amLODIPine   Tablet 10 milliGRAM(s) Oral daily  atorvastatin 40 milliGRAM(s) Oral at bedtime  ergocalciferol 22075 Unit(s) Oral <User Schedule>  metoprolol tartrate 25 milliGRAM(s) Oral two times a day  pantoprazole    Tablet 40 milliGRAM(s) Oral before breakfast  sodium bicarbonate  Infusion 0.046 mEq/kG/Hr IV Continuous <Continuous>  sodium chloride 0.9% lock flush 3 milliLiter(s) IV Push every 8 hours    Assessment  General: WN/WD NAD  Neurology: A&Ox3, nonfocal, BELLO x 4  Respiratory: CTA B/L  CV: RRR, S1S2, no murmurs, rubs or gallops  Abdominal: Soft, NT, ND +BS, Last BM 9/15/2020  Extremities: Trace edema x2BLE, + peripheral pulses  Tubes/Wires: Pericardial drain remains in situ         PAST MEDICAL & SURGICAL HISTORY:  Current smoker    CKD (chronic kidney disease)    HTN (hypertension)    COPD, mild

## 2020-09-17 ENCOUNTER — TRANSCRIPTION ENCOUNTER (OUTPATIENT)
Age: 55
End: 2020-09-17

## 2020-09-17 LAB
ALBUMIN SERPL ELPH-MCNC: 2.6 G/DL — LOW (ref 3.3–5.2)
ALP SERPL-CCNC: 89 U/L — SIGNIFICANT CHANGE UP (ref 40–120)
ALT FLD-CCNC: 17 U/L — SIGNIFICANT CHANGE UP
ANION GAP SERPL CALC-SCNC: 13 MMOL/L — SIGNIFICANT CHANGE UP (ref 5–17)
ANION GAP SERPL CALC-SCNC: 13 MMOL/L — SIGNIFICANT CHANGE UP (ref 5–17)
AST SERPL-CCNC: 14 U/L — SIGNIFICANT CHANGE UP
BILIRUB SERPL-MCNC: <0.2 MG/DL — LOW (ref 0.4–2)
BUN SERPL-MCNC: 68 MG/DL — HIGH (ref 8–20)
BUN SERPL-MCNC: 69 MG/DL — HIGH (ref 8–20)
CALCIUM SERPL-MCNC: 8.6 MG/DL — SIGNIFICANT CHANGE UP (ref 8.6–10.2)
CALCIUM SERPL-MCNC: 8.8 MG/DL — SIGNIFICANT CHANGE UP (ref 8.6–10.2)
CHLORIDE SERPL-SCNC: 95 MMOL/L — LOW (ref 98–107)
CHLORIDE SERPL-SCNC: 98 MMOL/L — SIGNIFICANT CHANGE UP (ref 98–107)
CO2 SERPL-SCNC: 23 MMOL/L — SIGNIFICANT CHANGE UP (ref 22–29)
CO2 SERPL-SCNC: 24 MMOL/L — SIGNIFICANT CHANGE UP (ref 22–29)
CREAT SERPL-MCNC: 3.92 MG/DL — HIGH (ref 0.5–1.3)
CREAT SERPL-MCNC: 4 MG/DL — HIGH (ref 0.5–1.3)
GLUCOSE SERPL-MCNC: 105 MG/DL — HIGH (ref 70–99)
GLUCOSE SERPL-MCNC: 113 MG/DL — HIGH (ref 70–99)
HCT VFR BLD CALC: 37.7 % — SIGNIFICANT CHANGE UP (ref 34.5–45)
HGB BLD-MCNC: 11.5 G/DL — SIGNIFICANT CHANGE UP (ref 11.5–15.5)
MAGNESIUM SERPL-MCNC: 1.9 MG/DL — SIGNIFICANT CHANGE UP (ref 1.6–2.6)
MCHC RBC-ENTMCNC: 29.3 PG — SIGNIFICANT CHANGE UP (ref 27–34)
MCHC RBC-ENTMCNC: 30.5 GM/DL — LOW (ref 32–36)
MCV RBC AUTO: 96.2 FL — SIGNIFICANT CHANGE UP (ref 80–100)
NON-GYNECOLOGICAL CYTOLOGY STUDY: SIGNIFICANT CHANGE UP
PHOSPHATE SERPL-MCNC: 6.3 MG/DL — HIGH (ref 2.4–4.7)
PLATELET # BLD AUTO: 243 K/UL — SIGNIFICANT CHANGE UP (ref 150–400)
POTASSIUM SERPL-MCNC: 5.6 MMOL/L — HIGH (ref 3.5–5.3)
POTASSIUM SERPL-MCNC: 5.6 MMOL/L — HIGH (ref 3.5–5.3)
POTASSIUM SERPL-SCNC: 5.6 MMOL/L — HIGH (ref 3.5–5.3)
POTASSIUM SERPL-SCNC: 5.6 MMOL/L — HIGH (ref 3.5–5.3)
PROT SERPL-MCNC: 6.4 G/DL — LOW (ref 6.6–8.7)
RBC # BLD: 3.92 M/UL — SIGNIFICANT CHANGE UP (ref 3.8–5.2)
RBC # FLD: 17.4 % — HIGH (ref 10.3–14.5)
SODIUM SERPL-SCNC: 131 MMOL/L — LOW (ref 135–145)
SODIUM SERPL-SCNC: 134 MMOL/L — LOW (ref 135–145)
WBC # BLD: 10.7 K/UL — HIGH (ref 3.8–10.5)
WBC # FLD AUTO: 10.7 K/UL — HIGH (ref 3.8–10.5)

## 2020-09-17 PROCEDURE — 71045 X-RAY EXAM CHEST 1 VIEW: CPT | Mod: 26

## 2020-09-17 PROCEDURE — 99233 SBSQ HOSP IP/OBS HIGH 50: CPT

## 2020-09-17 PROCEDURE — 99232 SBSQ HOSP IP/OBS MODERATE 35: CPT

## 2020-09-17 RX ORDER — ASPIRIN/CALCIUM CARB/MAGNESIUM 324 MG
1 TABLET ORAL
Qty: 0 | Refills: 0 | DISCHARGE

## 2020-09-17 RX ORDER — SODIUM ZIRCONIUM CYCLOSILICATE 10 G/10G
10 POWDER, FOR SUSPENSION ORAL ONCE
Refills: 0 | Status: COMPLETED | OUTPATIENT
Start: 2020-09-17 | End: 2020-09-17

## 2020-09-17 RX ORDER — FUROSEMIDE 40 MG
10 TABLET ORAL
Qty: 500 | Refills: 0 | Status: DISCONTINUED | OUTPATIENT
Start: 2020-09-17 | End: 2020-09-18

## 2020-09-17 RX ORDER — ACETAMINOPHEN 500 MG
2 TABLET ORAL
Qty: 0 | Refills: 0 | DISCHARGE
Start: 2020-09-17

## 2020-09-17 RX ORDER — CALCITRIOL 0.5 UG/1
0.5 CAPSULE ORAL DAILY
Refills: 0 | Status: DISCONTINUED | OUTPATIENT
Start: 2020-09-17 | End: 2020-09-21

## 2020-09-17 RX ADMIN — MIRTAZAPINE 15 MILLIGRAM(S): 45 TABLET, ORALLY DISINTEGRATING ORAL at 21:19

## 2020-09-17 RX ADMIN — Medication 1 SPRAY(S): at 17:34

## 2020-09-17 RX ADMIN — Medication 110 MILLIGRAM(S): at 19:00

## 2020-09-17 RX ADMIN — OXYCODONE HYDROCHLORIDE 10 MILLIGRAM(S): 5 TABLET ORAL at 16:34

## 2020-09-17 RX ADMIN — SODIUM CHLORIDE 3 MILLILITER(S): 9 INJECTION INTRAMUSCULAR; INTRAVENOUS; SUBCUTANEOUS at 20:50

## 2020-09-17 RX ADMIN — OXYCODONE HYDROCHLORIDE 10 MILLIGRAM(S): 5 TABLET ORAL at 22:05

## 2020-09-17 RX ADMIN — CALCITRIOL 0.5 MICROGRAM(S): 0.5 CAPSULE ORAL at 17:35

## 2020-09-17 RX ADMIN — ALBUTEROL 2 PUFF(S): 90 AEROSOL, METERED ORAL at 21:14

## 2020-09-17 RX ADMIN — OXYCODONE HYDROCHLORIDE 10 MILLIGRAM(S): 5 TABLET ORAL at 23:05

## 2020-09-17 RX ADMIN — ATORVASTATIN CALCIUM 40 MILLIGRAM(S): 80 TABLET, FILM COATED ORAL at 21:19

## 2020-09-17 RX ADMIN — CEFTRIAXONE 100 MILLIGRAM(S): 500 INJECTION, POWDER, FOR SOLUTION INTRAMUSCULAR; INTRAVENOUS at 17:43

## 2020-09-17 RX ADMIN — Medication 1 TABLET(S): at 17:34

## 2020-09-17 RX ADMIN — Medication 25 MILLIGRAM(S): at 05:59

## 2020-09-17 RX ADMIN — Medication 25 MILLIGRAM(S): at 17:34

## 2020-09-17 RX ADMIN — Medication 5 MG/HR: at 17:41

## 2020-09-17 RX ADMIN — SODIUM CHLORIDE 3 MILLILITER(S): 9 INJECTION INTRAMUSCULAR; INTRAVENOUS; SUBCUTANEOUS at 16:00

## 2020-09-17 RX ADMIN — SODIUM CHLORIDE 3 MILLILITER(S): 9 INJECTION INTRAMUSCULAR; INTRAVENOUS; SUBCUTANEOUS at 14:32

## 2020-09-17 RX ADMIN — QUETIAPINE FUMARATE 300 MILLIGRAM(S): 200 TABLET, FILM COATED ORAL at 21:19

## 2020-09-17 RX ADMIN — OXYCODONE HYDROCHLORIDE 10 MILLIGRAM(S): 5 TABLET ORAL at 16:04
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
## 2020-09-17 NOTE — DISCHARGE NOTE PROVIDER - REASON FOR ADMISSION
Transfer from [facility] with pericardial effusion. Transfer from [facility] with pericardial effusion.  9/13 S/P pericardiocentesis Transfer from [facility] with pericardial effusion.
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
## 2020-09-17 NOTE — PROGRESS NOTE ADULT - PROBLEM SELECTOR PLAN 1
Now s/p pericardiocentesis on 9/13  Currently hemodynamically stable  Continue Tylenol PRN & Oxy IR for pain management  Diurese as per medicine and renal   Discussed on AM CTS rounds

## 2020-09-17 NOTE — DISCHARGE NOTE NURSING/CASE MANAGEMENT/SOCIAL WORK - NSDCPEWEB_GEN_ALL_CORE
[facility] for Tobacco Control website --- [email or web address] website --- [email or web address]

## 2020-09-17 NOTE — DISCHARGE NOTE PROVIDER - CARE PROVIDERS DIRECT ADDRESSES
,DirectAddress_Unknown,[email or web address] ,DirectAddress_Unknown,[email or web address],DirectAddress_Unknown

## 2020-09-17 NOTE — DISCHARGE NOTE PROVIDER - INSTRUCTIONS
DASH Diet: rich in fruits, vegetables, low-fat dairy products, whole grains, fish, poultry, beans, seeds, and nuts. It is low in salt and sodium, added sugars and sweets, fat and red meats.  Activity as tolerated  Incentive spirometry  Ambulate often  Shower only. Do not submerge wounds in water in tub, bath, pool, etc.

## 2020-09-17 NOTE — PROGRESS NOTE ADULT - SUBJECTIVE AND OBJECTIVE BOX
PANCHITO MONAHAN    [id number]    55y      Female    CC: admitted with SOB, fever, cough   feels much better now   Desaturating on RA while ambulating. wants to go home     INTERVAL HPI/OVERNIGHT EVENTS: no acute events     REVIEW OF SYSTEMS:    CONSTITUTIONAL: No fever, fatigue  RESPIRATORY: No cough, wheezing No shortness of breath  CARDIOVASCULAR: No chest pain, palpitations  GASTROINTESTINAL: No abdominal or epigastric pain. No nausea, vomiting      Vital Signs Last 24 Hrs  T(C): 36.4 (17 Sep 2020 10:00), Max: 36.9 (16 Sep 2020 15:45)  T(F): 97.5 (17 Sep 2020 10:00), Max: 98.5 (16 Sep 2020 15:45)  HR: 100 (17 Sep 2020 14:49) (54 - 105)  BP: 113/74 (17 Sep 2020 10:00) (101/68 - 118/73)  BP(mean): --  RR: 22 (17 Sep 2020 10:00) (20 - 22)  SpO2: 98% (17 Sep 2020 10:00) (91% - 98%)    PHYSICAL EXAM:    GENERAL: NAD, well-groomed, obese  HEENT: PERRL, +EOMI  NECK: soft, Supple, No JVD  CHEST/LUNG: Clear to percussion bilaterally; Left basal crackles+  HEART: S1S2+, Regular rate and rhythm; No murmur  ABDOMEN: Soft, Nontender, distended; Bowel sounds present  EXTREMITIES: No clubbing, cyanosis. 1+ pitting er edema  SKIN: No rashes or lesions  NEURO: AAOX3      09-15 @ : @ 07:00  --------------------------------------------------------  IN: 630 mL / OUT: 7.5 mL / NET: 622.5 mL     @ 07:  -   @ 15:10  --------------------------------------------------------  IN: 560 mL / OUT: 800 mL / NET: -240 mL        LABS:                        12.0   12.61 )-----------( 240      ( 16 Sep 2020 06:31 )             39.8         133<L>  |  97<L>  |  58.0<H>  ----------------------------<  111<H>  5.1   |  25.0  |  3.52<H>    Ca    8.9      16 Sep 2020 06:31  Phos  4.8     09-15  Mg     1.8         TPro  6.9  /  Alb  3.1<L>  /  TBili  0.3<L>  /  DBili  x   /  AST  52<H>  /  ALT  36<H>  /  AlkPhos  107  09-15      Urinalysis Basic - ( 15 Sep 2020 06:57 )    Color: Yellow / Appearance: Clear / S.010 / pH: x  Gluc: x / Ketone: Negative  / Bili: Negative / Urobili: Negative mg/dL   Blood: x / Protein: 500 mg/dL / Nitrite: Negative   Leuk Esterase: Trace / RBC: 0-2 /HPF / WBC 3-5   Sq Epi: x / Non Sq Epi: Occasional / Bacteria: Negative          MEDICATIONS  (STANDING):  ALBUTerol    90 MICROgram(s) HFA Inhaler 2 Puff(s) Inhalation every 6 hours  amLODIPine   Tablet 10 milliGRAM(s) Oral daily  atorvastatin 40 milliGRAM(s) Oral at bedtime  cefTRIAXone   IVPB 2000 milliGRAM(s) IV Intermittent every 24 hours  doxycycline IVPB 100 milliGRAM(s) IV Intermittent every 12 hours  ergocalciferol 91636 Unit(s) Oral <User Schedule>  fluticasone propionate 50 MICROgram(s)/spray Nasal Spray 1 Spray(s) Both Nostrils two times a day  influenza   Vaccine 0.5 milliLiter(s) IntraMuscular once  lactobacillus acidophilus 1 Tablet(s) Oral two times a day  metoprolol tartrate 25 milliGRAM(s) Oral two times a day  mirtazapine Soltab 15 milliGRAM(s) Oral at bedtime  nicotine - 21 mG/24Hr(s) Patch 1 patch Transdermal daily  pantoprazole    Tablet 40 milliGRAM(s) Oral before breakfast  QUEtiapine 300 milliGRAM(s) Oral daily  sodium chloride 0.9% lock flush 3 milliLiter(s) IV Push every 8 hours    MEDICATIONS  (PRN):  acetaminophen   Tablet .. 650 milliGRAM(s) Oral every 6 hours PRN Mild Pain (1 - 3)  melatonin 5 milliGRAM(s) Oral at bedtime PRN Insomnia  oxyCODONE    IR 5 milliGRAM(s) Oral every 6 hours PRN Moderate Pain (4 - 6)  oxyCODONE    IR 10 milliGRAM(s) Oral every 6 hours PRN Severe Pain (7 - 10)      RADIOLOGY & ADDITIONAL TESTS:   PANCHITO MONAHAN    [id number]    55y      Female    CC: admitted with SOB, fever, cough   feels much better now   Desaturating on RA while ambulating. wants to go home     INTERVAL HPI/OVERNIGHT EVENTS: no acute events     REVIEW OF SYSTEMS:    CONSTITUTIONAL: No fever, fatigue  RESPIRATORY: No cough, wheezing No shortness of breath  CARDIOVASCULAR: No chest pain, palpitations  GASTROINTESTINAL: No abdominal or epigastric pain. No nausea, vomiting      Vital Signs Last 24 Hrs  T(C): 36.4 (17 Sep 2020 10:00), Max: 36.9 (16 Sep 2020 15:45)  T(F): 97.5 (17 Sep 2020 10:00), Max: 98.5 (16 Sep 2020 15:45)  HR: 100 (17 Sep 2020 14:49) (54 - 105)  BP: 113/74 (17 Sep 2020 10:00) (101/68 - 118/73)  BP(mean): --  RR: 22 (17 Sep 2020 10:00) (20 - 22)  SpO2: 98% (17 Sep 2020 10:00) (91% - 98%)    PHYSICAL EXAM:    GENERAL: NAD, well-groomed, obese  HEENT: PERRL, +EOMI  NECK: soft, Supple, No JVD  CHEST/LUNG: Clear to percussion bilaterally; Left basal crackles+  HEART: S1S2+, Regular rate and rhythm; No murmur  ABDOMEN: Soft, Nontender, distended; Bowel sounds present  EXTREMITIES: No clubbing, cyanosis. 2+ pitting er edema  SKIN: No rashes or lesions  NEURO: AAOX3      09-15 @ : @ 07:00  --------------------------------------------------------  IN: 630 mL / OUT: 7.5 mL / NET: 622.5 mL     @ 07:  -   @ 15:10  --------------------------------------------------------  IN: 560 mL / OUT: 800 mL / NET: -240 mL        LABS:                        12.0   12.61 )-----------( 240      ( 16 Sep 2020 06:31 )             39.8         133<L>  |  97<L>  |  58.0<H>  ----------------------------<  111<H>  5.1   |  25.0  |  3.52<H>    Ca    8.9      16 Sep 2020 06:31  Phos  4.8     09-15  Mg     1.8         TPro  6.9  /  Alb  3.1<L>  /  TBili  0.3<L>  /  DBili  x   /  AST  52<H>  /  ALT  36<H>  /  AlkPhos  107  09-15      Urinalysis Basic - ( 15 Sep 2020 06:57 )    Color: Yellow / Appearance: Clear / S.010 / pH: x  Gluc: x / Ketone: Negative  / Bili: Negative / Urobili: Negative mg/dL   Blood: x / Protein: 500 mg/dL / Nitrite: Negative   Leuk Esterase: Trace / RBC: 0-2 /HPF / WBC 3-5   Sq Epi: x / Non Sq Epi: Occasional / Bacteria: Negative          MEDICATIONS  (STANDING):  ALBUTerol    90 MICROgram(s) HFA Inhaler 2 Puff(s) Inhalation every 6 hours  amLODIPine   Tablet 10 milliGRAM(s) Oral daily  atorvastatin 40 milliGRAM(s) Oral at bedtime  cefTRIAXone   IVPB 2000 milliGRAM(s) IV Intermittent every 24 hours  doxycycline IVPB 100 milliGRAM(s) IV Intermittent every 12 hours  ergocalciferol 81469 Unit(s) Oral <User Schedule>  fluticasone propionate 50 MICROgram(s)/spray Nasal Spray 1 Spray(s) Both Nostrils two times a day  influenza   Vaccine 0.5 milliLiter(s) IntraMuscular once  lactobacillus acidophilus 1 Tablet(s) Oral two times a day  metoprolol tartrate 25 milliGRAM(s) Oral two times a day  mirtazapine Soltab 15 milliGRAM(s) Oral at bedtime  nicotine - 21 mG/24Hr(s) Patch 1 patch Transdermal daily  pantoprazole    Tablet 40 milliGRAM(s) Oral before breakfast  QUEtiapine 300 milliGRAM(s) Oral daily  sodium chloride 0.9% lock flush 3 milliLiter(s) IV Push every 8 hours    MEDICATIONS  (PRN):  acetaminophen   Tablet .. 650 milliGRAM(s) Oral every 6 hours PRN Mild Pain (1 - 3)  melatonin 5 milliGRAM(s) Oral at bedtime PRN Insomnia  oxyCODONE    IR 5 milliGRAM(s) Oral every 6 hours PRN Moderate Pain (4 - 6)  oxyCODONE    IR 10 milliGRAM(s) Oral every 6 hours PRN Severe Pain (7 - 10)      RADIOLOGY & ADDITIONAL TESTS:

## 2020-09-17 NOTE — CHART NOTE - NSCHARTNOTEFT_GEN_A_CORE
56yo F with eval for home oxygen with PMH of COPD.     Resting on RA 91%  Exercise on RA 86%  Exercise on 3L/min NC 95%    Patient in need of home O2 and aware. DX of COPD. Oxygen will improve her oxygenation, where other treatments have not.     Kyung MARTIN  Thoracic Sx

## 2020-09-17 NOTE — DISCHARGE NOTE PROVIDER - NSDCQMERRANDS_GEN_ALL_CORE
Oxytocin Safety Progress Check Note - Susanna Martin To 45 y o  female MRN: [id number]    Unit/Bed#: L&D 325-01 Encounter: [id number]    Dose (asher-units/min) Oxytocin: 12 asher-units/min  Contraction Frequency (minutes): 2-5  Contraction Quality: Mild  Tachysystole: No   Dilation: 3        Effacement (%): 80  Station: -2  Baseline Rate: 145 bpm     FHR Category: Category I             Notes/comments:   SVE as above  Patient requesting epidural at this time  Continue pitocin titration    D/w Dr Jesse Helm MD 2/10/2020 5:26 AM No
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
## 2020-09-17 NOTE — PROGRESS NOTE ADULT - PROBLEM SELECTOR PLAN 4
Renal following   BUN/Creat unchanged, trend renal function,   hyperkalemia today- lasix and lokelma as per renal  Continue to hold nephrotoxic medication

## 2020-09-17 NOTE — DISCHARGE NOTE PROVIDER - PROVIDER TOKENS
PROVIDER:[TOKEN:[[id number]]],PROVIDER:[TOKEN:[[id number]]] PROVIDER:[TOKEN:[[id number]]],PROVIDER:[TOKEN:[[id number]]],PROVIDER:[TOKEN:[[id number]]]

## 2020-09-17 NOTE — DISCHARGE NOTE PROVIDER - NSDCCPCAREPLAN_GEN_ALL_CORE_FT
PRINCIPAL DISCHARGE DIAGNOSIS  Diagnosis: Pericardial effusion  Assessment and Plan of Treatment: Pericardial effusion s/p pericardiocentesis. Outpatient follow up.      SECONDARY DISCHARGE DIAGNOSES  Diagnosis: Chronic kidney disease, unspecified CKD stage  Assessment and Plan of Treatment: Chronic kidney disease, unspecified CKD stage. Stable. Outpatient follow up.    Diagnosis: COPD, mild  Assessment and Plan of Treatment: COPD, mild. Outpatient follow up. BUSTER eval.    Diagnosis: CAP (community acquired pneumonia)  Assessment and Plan of Treatment: CAP (community acquired pneumonia). ABX completed.

## 2020-09-17 NOTE — DISCHARGE NOTE PROVIDER - CARE PROVIDER_API CALL
Peace Bill (DO)  Internal Medicine  [address] [place] [address]  Phone: [phone]  Fax: [phone]  Follow Up Time:     Gabriela Best  THORACIC AND CARDIAC SURGERY  [address] [place] [address]  Phone: [phone]  Fax: [phone]  Follow Up Time:    Peace Bill (DO)  Internal Medicine  [address] [place] [address]  Phone: [phone]  Fax: [phone]  Follow Up Time:     Gabriela Best  THORACIC AND CARDIAC SURGERY  [address] [place] [address]  Phone: [phone]  Fax: [phone]  Follow Up Time:     Kimberlee Bryant  INTERNAL MEDICINE  [address] [place] [address]  Phone: [phone]  Fax: [phone]  Follow Up Time:

## 2020-09-17 NOTE — DISCHARGE NOTE PROVIDER - NSDCACTIVITY_GEN_ALL_CORE
Walking - Outdoors allowed/No heavy lifting/straining/Stairs allowed/Showering allowed/Walking - Indoors allowed

## 2020-09-17 NOTE — DISCHARGE NOTE NURSING/CASE MANAGEMENT/SOCIAL WORK - NSDCFUADDAPPT_GEN_ALL_CORE_FT
Please call for a follow up appointment with your primary care medical doctor upon discharge regarding your recent surgery and hospitalization.     Please follow up with cardiologist Dr. Bill within 1-2 weeks.   Please follow up with nephrologist Dr. Bryant in 1-2 weeks.   Please follow up with your pulmonologist with 2 weeks to be evaluated for obstructive sleep apena.     If needed, please call the office at #[phone] to make an appointment with Dr. Best.
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
## 2020-09-17 NOTE — DISCHARGE NOTE PROVIDER - NSDCFUADDINST_GEN_ALL_CORE_FT
Please do not drive until your pain is well controlled and you do not need to take opioid pain medications.   These medications may make you constipated. If so, please take over the counter stool softeners as directed.  Percocet contains tylenol.  Avoid taking additional tylenol and other over the counter cough/cold preparations. You may take tylenol in addition or in place of the oxycodone. Please use tylenol as directed on the bottle.

## 2020-09-17 NOTE — DISCHARGE NOTE NURSING/CASE MANAGEMENT/SOCIAL WORK - NSDCPEEMAIL_GEN_ALL_CORE
[facility] Tobacco [facility] email [email or web address]

## 2020-09-17 NOTE — DISCHARGE NOTE NURSING/CASE MANAGEMENT/SOCIAL WORK - PATIENT PORTAL LINK FT
You can access the FollowMyHealth Patient Portal offered by [facility] by registering at the following website: [email or web address]. By joining [employer]’s FollowMyHealth portal, you will also be able to view your health information using other applications (apps) compatible with our system.

## 2020-09-17 NOTE — DISCHARGE NOTE PROVIDER - NSDCMRMEDTOKEN_GEN_ALL_CORE_FT
acetaminophen 325 mg oral tablet: 2 tab(s) orally every 6 hours, As needed, Mild Pain (1 - 3)   acetaminophen 325 mg oral tablet: 2 tab(s) orally every 6 hours, As needed, Mild Pain (1 - 3)  Albuterol (Eqv-ProAir HFA) 90 mcg/inh inhalation aerosol: 2 puff(s) inhaled every 6 hours, As Needed  Ambien 5 mg oral tablet: 1 tab(s) orally once a day (at bedtime)  doxepin 150 mg oral capsule: 1 cap(s) orally once a day (at bedtime)  mirtazapine 15 mg oral tablet: 1 tab(s) orally once a day (at bedtime)  Norvasc 10 mg oral tablet: 1 tab(s) orally once a day  SEROquel  mg oral tablet, extended release: 1 tab(s) orally once a day (in the evening)   acetaminophen 325 mg oral tablet: 2 tab(s) orally every 6 hours, As needed, Mild Pain (1 - 3)  Albuterol (Eqv-ProAir HFA) 90 mcg/inh inhalation aerosol: 2 puff(s) inhaled every 6 hours, As Needed  Ambien 5 mg oral tablet: 1 tab(s) orally once a day (at bedtime)  calcitriol 0.5 mcg oral capsule: 1 cap(s) orally once a day  doxepin 150 mg oral capsule: 1 cap(s) orally once a day (at bedtime)  ergocalciferol 2000 intl units (50 mcg) oral capsule: 1 cap(s) orally once a day   fluticasone 50 mcg/inh nasal spray: 1 spray(s) nasal 2 times a day  Lipitor 40 mg oral tablet: 1 tab(s) orally once a day (at bedtime)  metoprolol succinate 50 mg oral capsule, extended release: 1 tab(s) orally once a day   mirtazapine 15 mg oral tablet: 1 tab(s) orally once a day (at bedtime)  nicotine 21 mg/24 hr transdermal film, extended release:  transdermal   Norvasc 10 mg oral tablet: 1 tab(s) orally once a day  SEROquel  mg oral tablet, extended release: 1 tab(s) orally once a day (in the evening)

## 2020-09-17 NOTE — PROGRESS NOTE ADULT - SUBJECTIVE AND OBJECTIVE BOX
Subjective: Patient with no complaints. Denies SOB, CP, fever, chills, cough. Admits to b/l swelling. Still requiring supplemental oxygen.     Vital Signs:  Vital Signs Last 24 Hrs  T(C): 36.4 (09-17-20 @ 10:00), Max: 36.9 (09-16-20 @ 15:45)  T(F): 97.5 (09-17-20 @ 10:00), Max: 98.5 (09-16-20 @ 15:45)  HR: 92 (09-17-20 @ 10:00) (54 - 105)  BP: 113/74 (09-17-20 @ 10:00) (101/68 - 118/73)  RR: 22 (09-17-20 @ 10:00) (20 - 22)  SpO2: 98% (09-17-20 @ 10:00) (91% - 98%) on (O2)    I&O's Detail    16 Sep 2020 07:01  -  17 Sep 2020 07:00  --------------------------------------------------------  IN:    Oral Fluid: 560 mL  Total IN: 560 mL    OUT:    Voided (mL): 1900 mL  Total OUT: 1900 mL    Total NET: -1340 mL          General: NAD  Neurology: Awake, nonfocal, BELLO x 4  Eyes: Scleras clear, PERRLA/ EOMI, Gross vision intact  ENT: Gross hearing intact, grossly patent pharynx, no stridor  Neck: Neck supple, trachea midline, No JVD  Respiratory: CTA B/L, No wheezing, rales, rhonchi  CV: S1S2, no murmurs, rubs or gallops  Abdominal: Soft, NT, ND  Extremities: b/l LE edema  Psych: Oriented x 3      Relevant labs, radiology and Medications reviewed                        11.5   10.70 )-----------( 243      ( 17 Sep 2020 06:09 )             37.7     09-17    134<L>  |  98  |  68.0<H>  ----------------------------<  105<H>  5.6<H>   |  24.0  |  3.92<H>    Ca    8.8      17 Sep 2020 06:09  Phos  6.3     09-17  Mg     1.9     09-17    TPro  6.4<L>  /  Alb  2.6<L>  /  TBili  <0.2<L>  /  DBili  x   /  AST  14  /  ALT  17  /  AlkPhos  89  09-17      MEDICATIONS  (STANDING):  ALBUTerol    90 MICROgram(s) HFA Inhaler 2 Puff(s) Inhalation every 6 hours  amLODIPine   Tablet 10 milliGRAM(s) Oral daily  atorvastatin 40 milliGRAM(s) Oral at bedtime  cefTRIAXone   IVPB 2000 milliGRAM(s) IV Intermittent every 24 hours  doxycycline IVPB 100 milliGRAM(s) IV Intermittent every 12 hours  ergocalciferol 39846 Unit(s) Oral <User Schedule>  fluticasone propionate 50 MICROgram(s)/spray Nasal Spray 1 Spray(s) Both Nostrils two times a day  influenza   Vaccine 0.5 milliLiter(s) IntraMuscular once  lactobacillus acidophilus 1 Tablet(s) Oral two times a day  metoprolol tartrate 25 milliGRAM(s) Oral two times a day  mirtazapine Soltab 15 milliGRAM(s) Oral at bedtime  nicotine - 21 mG/24Hr(s) Patch 1 patch Transdermal daily  pantoprazole    Tablet 40 milliGRAM(s) Oral before breakfast  QUEtiapine 300 milliGRAM(s) Oral daily  sodium chloride 0.9% lock flush 3 milliLiter(s) IV Push every 8 hours    MEDICATIONS  (PRN):  acetaminophen   Tablet .. 650 milliGRAM(s) Oral every 6 hours PRN Mild Pain (1 - 3)  melatonin 5 milliGRAM(s) Oral at bedtime PRN Insomnia  oxyCODONE    IR 5 milliGRAM(s) Oral every 6 hours PRN Moderate Pain (4 - 6)  oxyCODONE    IR 10 milliGRAM(s) Oral every 6 hours PRN Severe Pain (7 - 10)        Assessment  55y Female  w/ PAST MEDICAL & SURGICAL HISTORY:  Current smoker    CKD (chronic kidney disease)    HTN (hypertension)    COPD, mild    admitted with complaints of Patient is a 55y old  Female who presents with a chief complaint of Transfer from [facility] with pericardial effusion. (17 Sep 2020 11:03)

## 2020-09-17 NOTE — DISCHARGE NOTE PROVIDER - NSDCFUADDAPPT_GEN_ALL_CORE_FT
Please call for a follow up appointment with your primary care medical doctor upon discharge regarding your recent surgery and hospitalization.     Please follow up with cardiologist Dr. Bill within 1-2 weeks.     Please follow up with your pulmonologist with 2 weeks to be evaluated for obstructive sleep apena.     If needed, please call the office at #[phone] to make an appointment with Dr. Best. Please call for a follow up appointment with your primary care medical doctor upon discharge regarding your recent surgery and hospitalization.     Please follow up with cardiologist Dr. Bill within 1-2 weeks.   Please follow up with nephrologist Dr. Bryant in 1-2 weeks.   Please follow up with your pulmonologist with 2 weeks to be evaluated for obstructive sleep apena.     If needed, please call the office at #[phone] to make an appointment with Dr. Best. Please call for a follow up appointment with your primary care medical doctor upon discharge regarding your recent surgery and hospitalization.     Please follow up with cardiologist Dr. Bill within 1-2 weeks.   Please follow up with nephrologist Dr. Bryant in 1-2 weeks.   Please follow up with your pulmonologist with 2 weeks to be evaluated for obstructive sleep apnea.     If needed, please call the office at #[phone] to make an appointment with Dr. Best.

## 2020-09-18 LAB
ANION GAP SERPL CALC-SCNC: 13 MMOL/L — SIGNIFICANT CHANGE UP (ref 5–17)
ANION GAP SERPL CALC-SCNC: 15 MMOL/L — SIGNIFICANT CHANGE UP (ref 5–17)
BUN SERPL-MCNC: 76 MG/DL — HIGH (ref 8–20)
BUN SERPL-MCNC: 76 MG/DL — HIGH (ref 8–20)
CALCIUM SERPL-MCNC: 9.1 MG/DL — SIGNIFICANT CHANGE UP (ref 8.6–10.2)
CALCIUM SERPL-MCNC: 9.4 MG/DL — SIGNIFICANT CHANGE UP (ref 8.6–10.2)
CHLORIDE SERPL-SCNC: 92 MMOL/L — LOW (ref 98–107)
CHLORIDE SERPL-SCNC: 93 MMOL/L — LOW (ref 98–107)
CO2 SERPL-SCNC: 22 MMOL/L — SIGNIFICANT CHANGE UP (ref 22–29)
CO2 SERPL-SCNC: 24 MMOL/L — SIGNIFICANT CHANGE UP (ref 22–29)
CREAT SERPL-MCNC: 4.18 MG/DL — HIGH (ref 0.5–1.3)
CREAT SERPL-MCNC: 4.4 MG/DL — HIGH (ref 0.5–1.3)
CULTURE RESULTS: SIGNIFICANT CHANGE UP
GLUCOSE SERPL-MCNC: 97 MG/DL — SIGNIFICANT CHANGE UP (ref 70–99)
GLUCOSE SERPL-MCNC: 99 MG/DL — SIGNIFICANT CHANGE UP (ref 70–99)
HCT VFR BLD CALC: 38 % — SIGNIFICANT CHANGE UP (ref 34.5–45)
HGB BLD-MCNC: 11.5 G/DL — SIGNIFICANT CHANGE UP (ref 11.5–15.5)
MAGNESIUM SERPL-MCNC: 1.8 MG/DL — SIGNIFICANT CHANGE UP (ref 1.8–2.6)
MCHC RBC-ENTMCNC: 28.8 PG — SIGNIFICANT CHANGE UP (ref 27–34)
MCHC RBC-ENTMCNC: 30.3 GM/DL — LOW (ref 32–36)
MCV RBC AUTO: 95 FL — SIGNIFICANT CHANGE UP (ref 80–100)
PHOSPHATE SERPL-MCNC: 6.5 MG/DL — HIGH (ref 2.4–4.7)
PLATELET # BLD AUTO: 252 K/UL — SIGNIFICANT CHANGE UP (ref 150–400)
POTASSIUM SERPL-MCNC: 5.3 MMOL/L — SIGNIFICANT CHANGE UP (ref 3.5–5.3)
POTASSIUM SERPL-MCNC: 5.4 MMOL/L — HIGH (ref 3.5–5.3)
POTASSIUM SERPL-SCNC: 5.3 MMOL/L — SIGNIFICANT CHANGE UP (ref 3.5–5.3)
POTASSIUM SERPL-SCNC: 5.4 MMOL/L — HIGH (ref 3.5–5.3)
RBC # BLD: 4 M/UL — SIGNIFICANT CHANGE UP (ref 3.8–5.2)
RBC # FLD: 17.2 % — HIGH (ref 10.3–14.5)
SODIUM SERPL-SCNC: 129 MMOL/L — LOW (ref 135–145)
SODIUM SERPL-SCNC: 130 MMOL/L — LOW (ref 135–145)
SPECIMEN SOURCE: SIGNIFICANT CHANGE UP
WBC # BLD: 9.59 K/UL — SIGNIFICANT CHANGE UP (ref 3.8–10.5)
WBC # FLD AUTO: 9.59 K/UL — SIGNIFICANT CHANGE UP (ref 3.8–10.5)

## 2020-09-18 PROCEDURE — 99233 SBSQ HOSP IP/OBS HIGH 50: CPT

## 2020-09-18 PROCEDURE — 99024 POSTOP FOLLOW-UP VISIT: CPT

## 2020-09-18 RX ORDER — FUROSEMIDE 40 MG
40 TABLET ORAL DAILY
Refills: 0 | Status: DISCONTINUED | OUTPATIENT
Start: 2020-09-18 | End: 2020-09-21

## 2020-09-18 RX ADMIN — ALBUTEROL 2 PUFF(S): 90 AEROSOL, METERED ORAL at 20:14

## 2020-09-18 RX ADMIN — PANTOPRAZOLE SODIUM 40 MILLIGRAM(S): 20 TABLET, DELAYED RELEASE ORAL at 05:54

## 2020-09-18 RX ADMIN — MIRTAZAPINE 15 MILLIGRAM(S): 45 TABLET, ORALLY DISINTEGRATING ORAL at 21:33

## 2020-09-18 RX ADMIN — SODIUM CHLORIDE 3 MILLILITER(S): 9 INJECTION INTRAMUSCULAR; INTRAVENOUS; SUBCUTANEOUS at 06:04

## 2020-09-18 RX ADMIN — ALBUTEROL 2 PUFF(S): 90 AEROSOL, METERED ORAL at 14:13

## 2020-09-18 RX ADMIN — OXYCODONE HYDROCHLORIDE 10 MILLIGRAM(S): 5 TABLET ORAL at 05:53

## 2020-09-18 RX ADMIN — Medication 40 MILLIGRAM(S): at 18:27

## 2020-09-18 RX ADMIN — OXYCODONE HYDROCHLORIDE 10 MILLIGRAM(S): 5 TABLET ORAL at 23:28

## 2020-09-18 RX ADMIN — SODIUM CHLORIDE 3 MILLILITER(S): 9 INJECTION INTRAMUSCULAR; INTRAVENOUS; SUBCUTANEOUS at 23:28

## 2020-09-18 RX ADMIN — AMLODIPINE BESYLATE 10 MILLIGRAM(S): 2.5 TABLET ORAL at 05:55

## 2020-09-18 RX ADMIN — Medication 1 SPRAY(S): at 18:27

## 2020-09-18 RX ADMIN — OXYCODONE HYDROCHLORIDE 10 MILLIGRAM(S): 5 TABLET ORAL at 13:47

## 2020-09-18 RX ADMIN — Medication 1 TABLET(S): at 18:27

## 2020-09-18 RX ADMIN — SODIUM CHLORIDE 3 MILLILITER(S): 9 INJECTION INTRAMUSCULAR; INTRAVENOUS; SUBCUTANEOUS at 13:15

## 2020-09-18 RX ADMIN — ALBUTEROL 2 PUFF(S): 90 AEROSOL, METERED ORAL at 08:25

## 2020-09-18 RX ADMIN — OXYCODONE HYDROCHLORIDE 10 MILLIGRAM(S): 5 TABLET ORAL at 21:34

## 2020-09-18 RX ADMIN — ATORVASTATIN CALCIUM 40 MILLIGRAM(S): 80 TABLET, FILM COATED ORAL at 21:33

## 2020-09-18 RX ADMIN — CALCITRIOL 0.5 MICROGRAM(S): 0.5 CAPSULE ORAL at 11:25

## 2020-09-18 RX ADMIN — Medication 1 TABLET(S): at 05:54

## 2020-09-18 RX ADMIN — Medication 25 MILLIGRAM(S): at 18:27

## 2020-09-18 RX ADMIN — OXYCODONE HYDROCHLORIDE 10 MILLIGRAM(S): 5 TABLET ORAL at 06:53

## 2020-09-18 RX ADMIN — Medication 25 MILLIGRAM(S): at 05:55

## 2020-09-18 RX ADMIN — OXYCODONE HYDROCHLORIDE 10 MILLIGRAM(S): 5 TABLET ORAL at 13:17

## 2020-09-18 NOTE — PROGRESS NOTE ADULT - SUBJECTIVE AND OBJECTIVE BOX
[facility] DIVISION OF KIDNEY DISEASES AND HYPERTENSION -- FOLLOW UP NOTE  --------------------------------------------------------------------------------  Chief Complaint:  CKD; fluid overload    24 hour events/subjective:  Pt seen/examined this AM  Edema improved;  Sitting up in NAD ; complaining of general aches/pains with knees;      PAST HISTORY  --------------------------------------------------------------------------------  No significant changes to PMH, PSH, FHx, SHx, unless otherwise noted    ALLERGIES & MEDICATIONS  --------------------------------------------------------------------------------  Allergies    No Known Allergies    Intolerances      Standing Inpatient Medications  ALBUTerol    90 MICROgram(s) HFA Inhaler 2 Puff(s) Inhalation every 6 hours  amLODIPine   Tablet 10 milliGRAM(s) Oral daily  atorvastatin 40 milliGRAM(s) Oral at bedtime  calcitriol   Capsule 0.5 MICROGram(s) Oral daily  ergocalciferol 46063 Unit(s) Oral <User Schedule>  fluticasone propionate 50 MICROgram(s)/spray Nasal Spray 1 Spray(s) Both Nostrils two times a day  furosemide   Injectable 40 milliGRAM(s) IV Push daily  influenza   Vaccine 0.5 milliLiter(s) IntraMuscular once  lactobacillus acidophilus 1 Tablet(s) Oral two times a day  metoprolol tartrate 25 milliGRAM(s) Oral two times a day  mirtazapine Soltab 15 milliGRAM(s) Oral at bedtime  nicotine - 21 mG/24Hr(s) Patch 1 patch Transdermal daily  pantoprazole    Tablet 40 milliGRAM(s) Oral before breakfast  QUEtiapine 300 milliGRAM(s) Oral daily  sodium chloride 0.9% lock flush 3 milliLiter(s) IV Push every 8 hours    PRN Inpatient Medications  acetaminophen   Tablet .. 650 milliGRAM(s) Oral every 6 hours PRN  melatonin 5 milliGRAM(s) Oral at bedtime PRN  oxyCODONE    IR 5 milliGRAM(s) Oral every 6 hours PRN  oxyCODONE    IR 10 milliGRAM(s) Oral every 6 hours PRN      REVIEW OF SYSTEMS  --------------------------------------------------------------------------------  Gen: No weight changes, fatigue, fevers/chills, weakness  Skin: No rashes  Head/Eyes/Ears/Mouth: No headache; Normal hearing; Normal vision w/o blurriness; No sinus pain/discomfort, sore throat  Respiratory: No dyspnea, cough, wheezing, hemoptysis  CV: No chest pain, PND, orthopnea  GI: No abdominal pain, diarrhea, constipation, nausea, vomiting, melena, hematochezia  : No increased frequency, dysuria, hematuria, nocturia  MSK: No joint pain/swelling; no back pain; +edema  Neuro: No dizziness/lightheadedness, weakness, seizures, numbness, tingling  Heme: No easy bruising or bleeding  Endo: No heat/cold intolerance  Psych: No significant nervousness, anxiety, stress, depression    All other systems were reviewed and are negative, except as noted.    VITALS/PHYSICAL EXAM  --------------------------------------------------------------------------------  T(C): 36.9 (09-18-20 @ 11:36), Max: 37 (09-17-20 @ 21:00)  HR: 59 (09-18-20 @ 11:36) (59 - 103)  BP: 120/70 (09-18-20 @ 11:36) (110/73 - 129/79)  RR: 18 (09-18-20 @ 11:36) (16 - 20)  SpO2: 96% (09-18-20 @ 11:36) (95% - 97%)  Wt(kg): --        09-17-20 @ 07:01  -  09-18-20 @ 07:00  --------------------------------------------------------  IN: 920 mL / OUT: 2900 mL / NET: -1980 mL      Physical Exam:  	Gen: NAD, well-appearing  	HEENT: PERRL, supple neck, clear oropharynx  	Pulm: CTA B/L  	CV: RRR, S1S2; no rub  	Back: No spinal or CVA tenderness; no sacral edema  	Abd: +BS, soft, nontender/nondistended  	: No suprapubic tenderness  	UE: Warm, FROM, no clubbing, intact strength; no edema; no asterixis  	LE: Warm, FROM, no clubbing, intact strength; + edema  	Neuro: No focal deficits, intact gait  	Psych: Normal affect and mood  	Skin: Warm, without rashes  	Vascular access:    LABS/STUDIES  --------------------------------------------------------------------------------              11.5   9.59  >-----------<  252      [09-18-20 @ 06:46]              38.0     130  |  93  |  76.0  ----------------------------<  99      [09-18-20 @ 06:46]  5.3   |  24.0  |  4.40        Ca     9.1     [09-18-20 @ 06:46]      Mg     1.8     [09-18-20 @ 06:46]      Phos  6.5     [09-18-20 @ 06:46]    TPro  6.4  /  Alb  2.6  /  TBili  <0.2  /  DBili  x   /  AST  14  /  ALT  17  /  AlkPhos  89  [09-17-20 @ 06:09]          Creatinine Trend:  SCr 4.40 [09-18 @ 06:46]  SCr 4.00 [09-17 @ 19:28]  SCr 3.92 [09-17 @ 06:09]  SCr 3.52 [09-16 @ 06:31]  SCr 3.27 [09-15 @ 06:26]    Urinalysis - [09-15-20 @ 06:57]      Color Yellow / Appearance Clear / SG 1.010 / pH 6.0      Gluc Negative / Ketone Negative  / Bili Negative / Urobili Negative       Blood Trace / Protein 500 / Leuk Est Trace / Nitrite Negative      RBC 0-2 / WBC 3-5 / Hyaline  / Gran  / Sq Epi  / Non Sq Epi Occasional / Bacteria Negative    Urine Protein 166.0      [09-15-20 @ 06:56]  Urine Sodium 45      [09-15-20 @ 06:56]  Urine Potassium 16      [09-15-20 @ 06:56]  Urine Osmolality 287      [09-15-20 @ 06:57]    PTH -- (Ca 8.9)      [09-15-20 @ 15:58]   225  Vitamin D (25OH) 16.0      [09-15-20 @ 15:58]  TSH 1.86      [09-13-20 @ 00:47]    HCV 0.09, Nonreact      [09-14-20 @ 19:32]

## 2020-09-18 NOTE — PROGRESS NOTE ADULT - SUBJECTIVE AND OBJECTIVE BOX
[PANCHITO MONAHAN    [id number]    55y      Female    CC: admitted with SOB, fever, cough   Desaturating on RA while ambulating.   does not understand her renal issues, says her pedal swelling is 2/2 her bad knees.     INTERVAL HPI/OVERNIGHT EVENTS: no acute events     REVIEW OF SYSTEMS:    CONSTITUTIONAL: No fever, fatigue  RESPIRATORY: No cough, wheezing No shortness of breath  CARDIOVASCULAR: No chest pain, palpitations  GASTROINTESTINAL: No abdominal or epigastric pain. No nausea, vomiting      Vital Signs Last 24 Hrs  T(C): 36.9 (18 Sep 2020 11:36), Max: 37 (17 Sep 2020 21:00)  T(F): 98.4 (18 Sep 2020 11:36), Max: 98.6 (17 Sep 2020 21:00)  HR: 59 (18 Sep 2020 11:36) (59 - 103)  BP: 120/70 (18 Sep 2020 11:36) (110/73 - 129/79)  BP(mean): --  RR: 18 (18 Sep 2020 11:36) (16 - 20)  SpO2: 96% (18 Sep 2020 11:36) (95% - 97%)    PHYSICAL EXAM:    GENERAL: NAD, well-groomed, obese  HEENT: PERRL, +EOMI  NECK: soft, Supple, No JVD  CHEST/LUNG: Clear to percussion bilaterally; Left basal crackles+ improving  HEART: S1S2+, Regular rate and rhythm; No murmur  ABDOMEN: Soft, Nontender, distended; Bowel sounds present  EXTREMITIES: No clubbing, cyanosis. 2+ pitting er edema  SKIN: No rashes or lesions  NEURO: AAOX3              LABS:                                11.5   9.59  )-----------( 252      ( 18 Sep 2020 06:46 )             38.0   09-18    130<L>  |  93<L>  |  76.0<H>  ----------------------------<  99  5.3   |  24.0  |  4.40<H>    Ca    9.1      18 Sep 2020 06:46  Phos  6.5     09-18  Mg     1.8     09-18    TPro  6.4<L>  /  Alb  2.6<L>  /  TBili  <0.2<L>  /  DBili  x   /  AST  14  /  ALT  17  /  AlkPhos  89  09-17            MEDICATIONS  (STANDING):  ALBUTerol    90 MICROgram(s) HFA Inhaler 2 Puff(s) Inhalation every 6 hours  amLODIPine   Tablet 10 milliGRAM(s) Oral daily  atorvastatin 40 milliGRAM(s) Oral at bedtime  calcitriol   Capsule 0.5 MICROGram(s) Oral daily  ergocalciferol 35443 Unit(s) Oral <User Schedule>  fluticasone propionate 50 MICROgram(s)/spray Nasal Spray 1 Spray(s) Both Nostrils two times a day  furosemide   Injectable 40 milliGRAM(s) IV Push daily  influenza   Vaccine 0.5 milliLiter(s) IntraMuscular once  lactobacillus acidophilus 1 Tablet(s) Oral two times a day  metoprolol tartrate 25 milliGRAM(s) Oral two times a day  mirtazapine Soltab 15 milliGRAM(s) Oral at bedtime  nicotine - 21 mG/24Hr(s) Patch 1 patch Transdermal daily  pantoprazole    Tablet 40 milliGRAM(s) Oral before breakfast  QUEtiapine 300 milliGRAM(s) Oral daily  sodium chloride 0.9% lock flush 3 milliLiter(s) IV Push every 8 hours    MEDICATIONS  (PRN):  acetaminophen   Tablet .. 650 milliGRAM(s) Oral every 6 hours PRN Mild Pain (1 - 3)  melatonin 5 milliGRAM(s) Oral at bedtime PRN Insomnia  oxyCODONE    IR 5 milliGRAM(s) Oral every 6 hours PRN Moderate Pain (4 - 6)  oxyCODONE    IR 10 milliGRAM(s) Oral every 6 hours PRN Severe Pain (7 - 10)        RADIOLOGY & ADDITIONAL TESTS:   [Rx Dose / Side Effects] : Rx dose/side effects
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
## 2020-09-18 NOTE — PROGRESS NOTE ADULT - SUBJECTIVE AND OBJECTIVE BOX
Subjective: Patient lying in bed, no acute distress noted, stated "I am feeling better" denies chest pain, palpitation, dizziness, shortness of breath, back pain, abdominal pain, N/V?D.    VITAL SIGNS  Vital Signs Last 24 Hrs  T(C): 37 (20 @ 21:00), Max: 37 (20 @ 21:00)  T(F): 98.6 (20 @ 21:00), Max: 98.6 (20 @ 21:00)  HR: 93 (20 @ 21:15) (89 - 101)  BP: 129/79 (20 @ 21:00) (110/73 - 129/79)  RR: 20 (20 @ 21:00) (20 - 22)  SpO2: 97% (20 @ 21:15) (94% - 98%)  on 3L O2.           Telemetry/Alarms:  SR    MEDICATIONS  acetaminophen   Tablet .. 650 milliGRAM(s) Oral every 6 hours PRN  ALBUTerol    90 MICROgram(s) HFA Inhaler 2 Puff(s) Inhalation every 6 hours  amLODIPine   Tablet 10 milliGRAM(s) Oral daily  atorvastatin 40 milliGRAM(s) Oral at bedtime  calcitriol   Capsule 0.5 MICROGram(s) Oral daily  ergocalciferol 62759 Unit(s) Oral <User Schedule>  fluticasone propionate 50 MICROgram(s)/spray Nasal Spray 1 Spray(s) Both Nostrils two times a day  furosemide Infusion 10 mG/Hr IV Continuous <Continuous>  influenza   Vaccine 0.5 milliLiter(s) IntraMuscular once  lactobacillus acidophilus 1 Tablet(s) Oral two times a day  melatonin 5 milliGRAM(s) Oral at bedtime PRN  metoprolol tartrate 25 milliGRAM(s) Oral two times a day  mirtazapine Soltab 15 milliGRAM(s) Oral at bedtime  nicotine - 21 mG/24Hr(s) Patch 1 patch Transdermal daily  oxyCODONE    IR 5 milliGRAM(s) Oral every 6 hours PRN  oxyCODONE    IR 10 milliGRAM(s) Oral every 6 hours PRN  pantoprazole    Tablet 40 milliGRAM(s) Oral before breakfast  QUEtiapine 300 milliGRAM(s) Oral daily  sodium chloride 0.9% lock flush 3 milliLiter(s) IV Push every 8 hours      PHYSICAL EXAM  General: well nourished, well developed, no acute distress  Neurology: alert and oriented x 3, nonfocal, no gross deficits  Respiratory: diminished breath sounds at the bases bilaterally, no accessory muscle use noted.   CV: regular rate and rhythm, normal S1, S2  Abdomen: soft, nontender, nondistended, positive bowel sounds  Extremities: warm, well perfused. Trace BLE edema. + DP pulses bilaterally  Psych: Appropriate mood and affect       @ 07:01  -   @ 07:00  --------------------------------------------------------  IN: 560 mL / OUT: 1900 mL / NET: -1340 mL     @ 07:01  -  18 @ 01:50  --------------------------------------------------------  IN: 775 mL / OUT: 1800 mL / NET: -1025 mL        Weights:  Daily     Daily Weight in k.3 (17 Sep 2020 05:05)  Admit Wt: Drug Dosing Weight  Height (cm): 165.1 (13 Sep 2020 00:15)  Weight (kg): 96.9 (13 Sep 2020 00:15)  BMI (kg/m2): 35.5 (13 Sep 2020 00:15)  BSA (m2): 2.03 (13 Sep 2020 00:15)    All laboratory results, radiology and medications reviewed.    LABS      131<L>  |  95<L>  |  69.0<H>  ----------------------------<  113<H>  5.6<H>   |  23.0  |  4.00<H>    Ca    8.6      17 Sep 2020 19:28  Phos  6.3       Mg     1.9         TPro  6.4<L>  /  Alb  2.6<L>  /  TBili  <0.2<L>  /  DBili  x   /  AST  14  /  ALT  17  /  AlkPhos  89                                   11.5   10.70 )-----------( 243      ( 17 Sep 2020 06:09 )             37.7            Bilirubin Total, Serum: <0.2 mg/dL ( @ 06:09)    < from: Xray Chest 1 View- PORTABLE-Routine (Xray Chest 1 View- PORTABLE-Routine in AM.) (20 @ 05:32) >      Impression:  1. Midline in place  2. Vascular congestion with bibasilar atelectatic changes/infiltrates and effusions, left worse than right, unchanged      < end of copied text >      PAST MEDICAL & SURGICAL HISTORY:  Current smoker    CKD (chronic kidney disease)    HTN (hypertension)    COPD, mild

## 2020-09-18 NOTE — PROGRESS NOTE ADULT - PROBLEM SELECTOR PLAN 4
Renal following   BUN/Creat unchanged, trend renal function,   hyperkalemia today- Renal following, recommendation appreciated  Continue Lasix gtt as per renal  Continue to hold nephrotoxic medication

## 2020-09-18 NOTE — DIETITIAN INITIAL EVALUATION ADULT. - PERTINENT LABORATORY DATA
09-18 Na130 mmol/L<L> Glu 99 mg/dL K+ 5.3 mmol/L Cr  4.40 mg/dL<H> BUN 76.0 mg/dL<H> Phos 6.5 mg/dL<H> Alb n/a   PAB n/a

## 2020-09-18 NOTE — CHART NOTE - NSCHARTNOTEFT_GEN_A_CORE
pt evaluated at bedside. Pt is stable at this time. Pt resting in bed comfortably. No acute events overnight. Pt denies any headache, syncope, chest pain, palpitations, SOB, difficulty breathing, nausea, vomiting, fevers, chills, abdominal discomfort, urinary retention.     Overnight pt was placed on Lasix gtt by renal. Awaiting further recs from them today. Repeat BMP pending for this afternoon as pt is on continuous diuresis at this time. pt evaluated at bedside. Pt is stable at this time. Pt resting in bed comfortably. No acute events overnight. Pt denies any headache, syncope, chest pain, palpitations, SOB, difficulty breathing, nausea, vomiting, fevers, chills, abdominal discomfort, urinary retention.     Overnight pt was placed on Lasix gtt by renal. Awaiting further recs from them today. Repeat BMP pending for this afternoon as pt is on continuous diuresis at this time.    9/18 as per renal stop lasix gtt and place on 40 IVP qd
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
## 2020-09-18 NOTE — PROGRESS NOTE ADULT - ASSESSMENT
1) CKD IV  2) HTN  3) HLD  4) Edema    On lasix drip 10mg/hr  Will stop drip  Start lasix 40mg IVP qd  SCr worsening;   No need for HD at this time;  Will close watch;  d/w Dr Garrett

## 2020-09-19 LAB
ALT FLD-CCNC: 19 U/L — SIGNIFICANT CHANGE UP
ANION GAP SERPL CALC-SCNC: 14 MMOL/L — SIGNIFICANT CHANGE UP (ref 5–17)
ANION GAP SERPL CALC-SCNC: 14 MMOL/L — SIGNIFICANT CHANGE UP (ref 5–17)
APPEARANCE UR: CLEAR — SIGNIFICANT CHANGE UP
BILIRUB UR-MCNC: NEGATIVE — SIGNIFICANT CHANGE UP
BUN SERPL-MCNC: 81 MG/DL — HIGH (ref 8–20)
BUN SERPL-MCNC: 83 MG/DL — HIGH (ref 8–20)
CALCIUM SERPL-MCNC: 9.2 MG/DL — SIGNIFICANT CHANGE UP (ref 8.6–10.2)
CALCIUM SERPL-MCNC: 9.2 MG/DL — SIGNIFICANT CHANGE UP (ref 8.6–10.2)
CHLORIDE SERPL-SCNC: 90 MMOL/L — LOW (ref 98–107)
CHLORIDE SERPL-SCNC: 95 MMOL/L — LOW (ref 98–107)
CO2 SERPL-SCNC: 24 MMOL/L — SIGNIFICANT CHANGE UP (ref 22–29)
CO2 SERPL-SCNC: 26 MMOL/L — SIGNIFICANT CHANGE UP (ref 22–29)
COLOR SPEC: YELLOW — SIGNIFICANT CHANGE UP
CREAT SERPL-MCNC: 4.53 MG/DL — HIGH (ref 0.5–1.3)
CREAT SERPL-MCNC: 4.57 MG/DL — HIGH (ref 0.5–1.3)
DIFF PNL FLD: ABNORMAL
EPI CELLS # UR: SIGNIFICANT CHANGE UP
GLUCOSE BLDC GLUCOMTR-MCNC: 108 MG/DL — HIGH (ref 70–99)
GLUCOSE SERPL-MCNC: 79 MG/DL — SIGNIFICANT CHANGE UP (ref 70–99)
GLUCOSE SERPL-MCNC: 98 MG/DL — SIGNIFICANT CHANGE UP (ref 70–99)
GLUCOSE UR QL: NEGATIVE MG/DL — SIGNIFICANT CHANGE UP
HCT VFR BLD CALC: 37.9 % — SIGNIFICANT CHANGE UP (ref 34.5–45)
HGB BLD-MCNC: 11.6 G/DL — SIGNIFICANT CHANGE UP (ref 11.5–15.5)
KETONES UR-MCNC: NEGATIVE — SIGNIFICANT CHANGE UP
LEUKOCYTE ESTERASE UR-ACNC: NEGATIVE — SIGNIFICANT CHANGE UP
MAGNESIUM SERPL-MCNC: 1.8 MG/DL — SIGNIFICANT CHANGE UP (ref 1.6–2.6)
MCHC RBC-ENTMCNC: 28.8 PG — SIGNIFICANT CHANGE UP (ref 27–34)
MCHC RBC-ENTMCNC: 30.6 GM/DL — LOW (ref 32–36)
MCV RBC AUTO: 94 FL — SIGNIFICANT CHANGE UP (ref 80–100)
NITRITE UR-MCNC: NEGATIVE — SIGNIFICANT CHANGE UP
PH UR: 6 — SIGNIFICANT CHANGE UP (ref 5–8)
PLATELET # BLD AUTO: 267 K/UL — SIGNIFICANT CHANGE UP (ref 150–400)
POTASSIUM SERPL-MCNC: 4.8 MMOL/L — SIGNIFICANT CHANGE UP (ref 3.5–5.3)
POTASSIUM SERPL-MCNC: 5.5 MMOL/L — HIGH (ref 3.5–5.3)
POTASSIUM SERPL-SCNC: 4.8 MMOL/L — SIGNIFICANT CHANGE UP (ref 3.5–5.3)
POTASSIUM SERPL-SCNC: 5.5 MMOL/L — HIGH (ref 3.5–5.3)
PROT UR-MCNC: 100 MG/DL
RBC # BLD: 4.03 M/UL — SIGNIFICANT CHANGE UP (ref 3.8–5.2)
RBC # FLD: 16.8 % — HIGH (ref 10.3–14.5)
RBC CASTS # UR COMP ASSIST: SIGNIFICANT CHANGE UP /HPF (ref 0–4)
SODIUM SERPL-SCNC: 130 MMOL/L — LOW (ref 135–145)
SODIUM SERPL-SCNC: 132 MMOL/L — LOW (ref 135–145)
SP GR SPEC: 1.01 — SIGNIFICANT CHANGE UP (ref 1.01–1.02)
UROBILINOGEN FLD QL: NEGATIVE MG/DL — SIGNIFICANT CHANGE UP
WBC # BLD: 9.15 K/UL — SIGNIFICANT CHANGE UP (ref 3.8–10.5)
WBC # FLD AUTO: 9.15 K/UL — SIGNIFICANT CHANGE UP (ref 3.8–10.5)
WBC UR QL: NEGATIVE — SIGNIFICANT CHANGE UP

## 2020-09-19 PROCEDURE — 71045 X-RAY EXAM CHEST 1 VIEW: CPT | Mod: 26

## 2020-09-19 PROCEDURE — 99232 SBSQ HOSP IP/OBS MODERATE 35: CPT

## 2020-09-19 PROCEDURE — 99024 POSTOP FOLLOW-UP VISIT: CPT

## 2020-09-19 PROCEDURE — 99233 SBSQ HOSP IP/OBS HIGH 50: CPT

## 2020-09-19 RX ORDER — SODIUM ZIRCONIUM CYCLOSILICATE 10 G/10G
10 POWDER, FOR SUSPENSION ORAL ONCE
Refills: 0 | Status: COMPLETED | OUTPATIENT
Start: 2020-09-19 | End: 2020-09-19

## 2020-09-19 RX ORDER — OXYCODONE AND ACETAMINOPHEN 5; 325 MG/1; MG/1
2 TABLET ORAL ONCE
Refills: 0 | Status: DISCONTINUED | OUTPATIENT
Start: 2020-09-19 | End: 2020-09-19

## 2020-09-19 RX ORDER — ACETAMINOPHEN 500 MG
650 TABLET ORAL EVERY 6 HOURS
Refills: 0 | Status: DISCONTINUED | OUTPATIENT
Start: 2020-09-19 | End: 2020-09-19

## 2020-09-19 RX ORDER — TUBERCULIN PURIFIED PROTEIN DERIVATIVE 5 [IU]/.1ML
5 INJECTION, SOLUTION INTRADERMAL ONCE
Refills: 0 | Status: COMPLETED | OUTPATIENT
Start: 2020-09-19 | End: 2020-09-19

## 2020-09-19 RX ORDER — HEPARIN SODIUM 5000 [USP'U]/ML
5000 INJECTION INTRAVENOUS; SUBCUTANEOUS EVERY 12 HOURS
Refills: 0 | Status: DISCONTINUED | OUTPATIENT
Start: 2020-09-19 | End: 2020-09-21

## 2020-09-19 RX ADMIN — SODIUM CHLORIDE 3 MILLILITER(S): 9 INJECTION INTRAMUSCULAR; INTRAVENOUS; SUBCUTANEOUS at 20:54

## 2020-09-19 RX ADMIN — Medication 1 SPRAY(S): at 05:20

## 2020-09-19 RX ADMIN — ALBUTEROL 2 PUFF(S): 90 AEROSOL, METERED ORAL at 08:17

## 2020-09-19 RX ADMIN — Medication 1 TABLET(S): at 05:18

## 2020-09-19 RX ADMIN — Medication 1 SPRAY(S): at 17:33

## 2020-09-19 RX ADMIN — Medication 650 MILLIGRAM(S): at 05:18

## 2020-09-19 RX ADMIN — PANTOPRAZOLE SODIUM 40 MILLIGRAM(S): 20 TABLET, DELAYED RELEASE ORAL at 08:26

## 2020-09-19 RX ADMIN — SODIUM CHLORIDE 3 MILLILITER(S): 9 INJECTION INTRAMUSCULAR; INTRAVENOUS; SUBCUTANEOUS at 07:00

## 2020-09-19 RX ADMIN — Medication 40 MILLIGRAM(S): at 05:18

## 2020-09-19 RX ADMIN — MIRTAZAPINE 15 MILLIGRAM(S): 45 TABLET, ORALLY DISINTEGRATING ORAL at 20:58

## 2020-09-19 RX ADMIN — ATORVASTATIN CALCIUM 40 MILLIGRAM(S): 80 TABLET, FILM COATED ORAL at 20:59

## 2020-09-19 RX ADMIN — ALBUTEROL 2 PUFF(S): 90 AEROSOL, METERED ORAL at 20:22

## 2020-09-19 RX ADMIN — Medication 1 TABLET(S): at 17:34

## 2020-09-19 RX ADMIN — OXYCODONE AND ACETAMINOPHEN 2 TABLET(S): 5; 325 TABLET ORAL at 22:49

## 2020-09-19 RX ADMIN — SODIUM ZIRCONIUM CYCLOSILICATE 10 GRAM(S): 10 POWDER, FOR SUSPENSION ORAL at 13:01

## 2020-09-19 RX ADMIN — QUETIAPINE FUMARATE 300 MILLIGRAM(S): 200 TABLET, FILM COATED ORAL at 08:27

## 2020-09-19 RX ADMIN — AMLODIPINE BESYLATE 10 MILLIGRAM(S): 2.5 TABLET ORAL at 05:18

## 2020-09-19 RX ADMIN — Medication 25 MILLIGRAM(S): at 05:18

## 2020-09-19 RX ADMIN — TUBERCULIN PURIFIED PROTEIN DERIVATIVE 5 UNIT(S): 5 INJECTION, SOLUTION INTRADERMAL at 13:02

## 2020-09-19 RX ADMIN — HEPARIN SODIUM 5000 UNIT(S): 5000 INJECTION INTRAVENOUS; SUBCUTANEOUS at 17:37

## 2020-09-19 RX ADMIN — OXYCODONE AND ACETAMINOPHEN 2 TABLET(S): 5; 325 TABLET ORAL at 21:49

## 2020-09-19 RX ADMIN — Medication 25 MILLIGRAM(S): at 17:34

## 2020-09-19 RX ADMIN — Medication 650 MILLIGRAM(S): at 17:34

## 2020-09-19 RX ADMIN — Medication 1 TABLET(S): at 18:26

## 2020-09-19 RX ADMIN — SODIUM CHLORIDE 3 MILLILITER(S): 9 INJECTION INTRAMUSCULAR; INTRAVENOUS; SUBCUTANEOUS at 16:00

## 2020-09-19 RX ADMIN — CALCITRIOL 0.5 MICROGRAM(S): 0.5 CAPSULE ORAL at 08:27
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
## 2020-09-19 NOTE — PROGRESS NOTE ADULT - PROBLEM SELECTOR PLAN 4
Renal following   BUN/Creat uptrending, renal following, no HD now  Will repeat BMP in AM  hyperkalemia, renal following, received Lokelma (9/14)    Placed on Lasix gtt on 9/17 d/c yesterday (9/18), currently on 40 mg IV daily  Will repeat BMP in AM  Continue to hold nephrotoxic medication
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
## 2020-09-19 NOTE — PROGRESS NOTE ADULT - PROBLEM SELECTOR PLAN 1
Now s/p pericardiocentesis on 9/13, drain removed 9/16  Currently stable hemodynamically   Continue Tylenol PRN & Oxy IR for pain management  Diuresis as per renal, Lasix gtt was d/c on 9/18 and now on 40 mg IV daily  Repeat BMP, CBC in AM  Encourage OOB, ambulate, chest PT

## 2020-09-19 NOTE — PROGRESS NOTE ADULT - PROBLEM SELECTOR PLAN 7
Remains afebrile  Completed Ceftriaxone & Doxy for presumed CAP, last dose 9/17  Plan to discuss with Thoracic team on AM rounds.

## 2020-09-19 NOTE — PROGRESS NOTE ADULT - ASSESSMENT
1) CKD IV  2) HTN  3) HLD    Hyperkalemia Treated,    On Lasix,    Will Prepare for RRT,     Hepatitis Panel.  Vessel mapping,

## 2020-09-19 NOTE — PROGRESS NOTE ADULT - SUBJECTIVE AND OBJECTIVE BOX
Patient seen and examined . Comfortable , no complaints , as per this am note patient c/o sob on laying down. Had BM this am ,   voiding .     CC : sob this am - now resolved while sitting up     MEDICATIONS  (STANDING):  ALBUTerol    90 MICROgram(s) HFA Inhaler 2 Puff(s) Inhalation every 6 hours  amLODIPine   Tablet 10 milliGRAM(s) Oral daily  atorvastatin 40 milliGRAM(s) Oral at bedtime  calcitriol   Capsule 0.5 MICROGram(s) Oral daily  ergocalciferol 23856 Unit(s) Oral <User Schedule>  fluticasone propionate 50 MICROgram(s)/spray Nasal Spray 1 Spray(s) Both Nostrils two times a day  furosemide   Injectable 40 milliGRAM(s) IV Push daily  influenza   Vaccine 0.5 milliLiter(s) IntraMuscular once  lactobacillus acidophilus 1 Tablet(s) Oral two times a day  metoprolol tartrate 25 milliGRAM(s) Oral two times a day  mirtazapine Soltab 15 milliGRAM(s) Oral at bedtime  Nephro-vanna 1 Tablet(s) Oral daily  nicotine - 21 mG/24Hr(s) Patch 1 patch Transdermal daily  pantoprazole    Tablet 40 milliGRAM(s) Oral before breakfast  QUEtiapine 300 milliGRAM(s) Oral daily  sodium chloride 0.9% lock flush 3 milliLiter(s) IV Push every 8 hours    MEDICATIONS  (PRN):  acetaminophen   Tablet .. 650 milliGRAM(s) Oral every 6 hours PRN Mild Pain (1 - 3)  melatonin 5 milliGRAM(s) Oral at bedtime PRN Insomnia  oxyCODONE    IR 5 milliGRAM(s) Oral every 6 hours PRN Moderate Pain (4 - 6)  oxyCODONE    IR 10 milliGRAM(s) Oral every 6 hours PRN Severe Pain (7 - 10)      LABS:                          11.6   9.15  )-----------( 267      ( 19 Sep 2020 06:09 )             37.9     09-19    132<L>  |  95<L>  |  83.0<H>  ----------------------------<  98  5.5<H>   |  24.0  |  4.57<H>    Ca    9.2      19 Sep 2020 06:09  Phos  6.5     09-18  Mg     1.8     09-19    Culture - Body Fluid with Gram Stain (09.13.20 @ 21:17)    Gram Stain:   polymorphonuclear leukocytes seen  No organisms seen  by cytocentrifuge    Specimen Source: .Body Fluid Other, Pericardial Fluid    Culture Results:   No growth at 5 days    Specimen Source: .Body Fluid Other, Fluid (09.13.20 @ 21:18)    Culture Results:   Testing in progress (09.13.20 @ 21:18)        RADIOLOGY & ADDITIONAL TESTS:  < from: Xray Chest 1 View- PORTABLE-Routine (Xray Chest 1 View- PORTABLE-Routine in AM.) (09.19.20 @ 05:24) >     EXAM:  XR CHEST PORTABLE ROUTINE 1V                          PROCEDURE DATE:  09/19/2020          INTERPRETATION:  TECHNIQUE: Single portable view of the chest.    COMPARISON: 9/17/2020    CLINICAL HISTORY: eval lung fieldspleural effusions b.l    FINDINGS:    Single frontal view of the chest demonstrates mild CHF. Small bilateral pleural effusions, unchanged. Widened mediastinum, unchanged. Consider CT as clinically warranted. Moderate-sized pericardial effusion. The cardiomediastinal silhouette is enlarged. No acute osseous abnormalities. Overlying EKG leads and wires are noted    IMPRESSION: No interval change. CT as clinically warranted.      KRISTAN NOBLES M.D., ATTENDING RADIOLOGIST  This document has been electronically signed. Sep 19 2020  9:27AM    < end of copied text >        REVIEW OF SYSTEMS:    < from: Xray Chest 1 View- PORTABLE-Routine (Xray Chest 1 View- PORTABLE-Routine in AM.) (09.19.20 @ 05:24) >   EXAM:  XR CHEST PORTABLE ROUTINE 1V                          PROCEDURE DATE:  09/19/2020          INTERPRETATION:  TECHNIQUE: Single portable view of the chest.    COMPARISON: 9/17/2020    CLINICAL HISTORY: eval lung fieldspleural effusions b.l    FINDINGS:    Single frontal view of the chest demonstrates mild CHF. Small bilateral pleural effusions, unchanged. Widened mediastinum, unchanged. Consider CT as clinically warranted. Moderate-sized pericardial effusion. The cardiomediastinal silhouette is enlarged. No acute osseous abnormalities. Overlying EKG leads and wires are noted    IMPRESSION: No interval change. CT as clinically warranted.      KRISTAN NOBLES M.D., ATTENDING RADIOLOGIST  This document has been electronically signed. Sep 19 2020  9:27AM    < end of copied text >      Vital Signs Last 24 Hrs  T(C): 37.1 (19 Sep 2020 10:00), Max: 37.1 (19 Sep 2020 10:00)  T(F): 98.7 (19 Sep 2020 10:00), Max: 98.7 (19 Sep 2020 10:00)  HR: 111 (19 Sep 2020 10:00) (92 - 111)  BP: 113/71 (19 Sep 2020 10:00) (109/75 - 121/85)  BP(mean): --  RR: 18 (19 Sep 2020 10:00) (18 - 20)  SpO2: 95% (19 Sep 2020 10:00) (93% - 99%)  PHYSICAL EXAM:    GENERAL: NAD, well-groomed, well-developed  HEAD:  Atraumatic, Normocephalic  EYES: EOMI, PERRLA, conjunctiva and sclera clear  NECK: Supple, No JVD, Normal thyroid  NERVOUS SYSTEM:  Alert & Oriented X3, no focal deficit  CHEST/LUNG: CTA b/l ,  no  rales, rhonchi, wheezing, or rubs  HEART: Regular rate and rhythm; No murmurs, rubs, or gallops  ABDOMEN: Soft, Nontender, Nondistended; Bowel sounds present  EXTREMITIES:  2+ Peripheral Pulses, No clubbing, cyanosis, or edema  LYMPH: No lymphadenopathy noted  SKIN: No rashes or lesions

## 2020-09-19 NOTE — PROGRESS NOTE ADULT - SUBJECTIVE AND OBJECTIVE BOX
Subjective: Patient lying in bed, no acute distress noted, stated "I am feeling okay, jus wanted to go home, its hard to sit in this chair and we are missing a recliner here". Patient stated to have mild to moderate discomfort to lie  flat. Denies chest pain, palpitation, dizziness, shortness of breath, back pain, abdominal pain, N/V/D.      VITAL SIGNS  Vital Signs Last 24 Hrs  T(C): 36.9 (20 @ 21:22), Max: 36.9 (20 @ 11:36)  T(F): 98.5 (20 @ 21:22), Max: 98.5 (20 @ 21:22)  HR: 102 (20 @ 21:22) (59 - 104)  BP: 121/85 (20 @ 21:22) (109/75 - 121/85)  RR: 18 (20 @ 21:22) (16 - 20)  SpO2: 94% (20 @ 21:22) (93% - 99%)  on room air              Telemetry/Alarms:  SR    MEDICATIONS  acetaminophen   Tablet .. 650 milliGRAM(s) Oral every 6 hours PRN  ALBUTerol    90 MICROgram(s) HFA Inhaler 2 Puff(s) Inhalation every 6 hours  amLODIPine   Tablet 10 milliGRAM(s) Oral daily  atorvastatin 40 milliGRAM(s) Oral at bedtime  calcitriol   Capsule 0.5 MICROGram(s) Oral daily  ergocalciferol 12187 Unit(s) Oral <User Schedule>  fluticasone propionate 50 MICROgram(s)/spray Nasal Spray 1 Spray(s) Both Nostrils two times a day  furosemide   Injectable 40 milliGRAM(s) IV Push daily  influenza   Vaccine 0.5 milliLiter(s) IntraMuscular once  lactobacillus acidophilus 1 Tablet(s) Oral two times a day  melatonin 5 milliGRAM(s) Oral at bedtime PRN  metoprolol tartrate 25 milliGRAM(s) Oral two times a day  mirtazapine Soltab 15 milliGRAM(s) Oral at bedtime  Nephro-vanna 1 Tablet(s) Oral daily  nicotine - 21 mG/24Hr(s) Patch 1 patch Transdermal daily  oxyCODONE    IR 10 milliGRAM(s) Oral every 6 hours PRN  oxyCODONE    IR 5 milliGRAM(s) Oral every 6 hours PRN  pantoprazole    Tablet 40 milliGRAM(s) Oral before breakfast  QUEtiapine 300 milliGRAM(s) Oral daily  sodium chloride 0.9% lock flush 3 milliLiter(s) IV Push every 8 hours      PHYSICAL EXAM  General: well nourished, well developed, no acute distress  Neurology: alert and oriented x 3, nonfocal, no gross deficits  Respiratory: diminished breath sounds at the bases bilaterally, no accessory muscle use noted.   CV: regular rate and rhythm, normal S1, S2  Abdomen: soft, nontender, nondistended, positive bowel sounds  Extremities: warm, well perfused. +1 BLE edema L>R. + DP pulses bilaterally  Psych: Appropriate mood and affect         @ 07:01  -   @ 07:00  --------------------------------------------------------  IN: 920 mL / OUT: 2900 mL / NET: -1980 mL        Weights:  Daily     Daily Weight in k.6 (18 Sep 2020 09:52)  Admit Wt: Drug Dosing Weight  Height (cm): 165.1 (13 Sep 2020 00:15)  Weight (kg): 96.9 (13 Sep 2020 00:15)  BMI (kg/m2): 35.5 (13 Sep 2020 00:15)  BSA (m2): 2.03 (13 Sep 2020 00:15)    All laboratory results, radiology and medications reviewed.    LABS      129<L>  |  92<L>  |  76.0<H>  ----------------------------<  97  5.4<H>   |  22.0  |  4.18<H>    Ca    9.4      18 Sep 2020 12:57  Phos  6.5       Mg     1.8         TPro  6.4<L>  /  Alb  2.6<L>  /  TBili  <0.2<L>  /  DBili  x   /  AST  14  /  ALT  17  /  AlkPhos  89                                   11.5   9.59  )-----------( 252      ( 18 Sep 2020 06:46 )             38.0          < from: Xray Chest 1 View- PORTABLE-Routine (Xray Chest 1 View- PORTABLE-Routine in AM.) (20 @ 05:32) >    Impression:  1. Midline in place  2. Vascular congestion with bibasilar atelectatic changes/infiltrates and effusions, left worse than right, unchanged      < end of copied text >    < from: TTE Echo Limited or F/U (09.15.20 @ 10:34) >    Summary:   1. S/P pericardiocentesis. Pericardial drain still in place as per clinical history.   2. Hyperdynamic wall motion.   3. Left ventricular ejection fraction, by visual estimation, is >75%.   4. Normal right ventricular size and function.   5. Small pericardial effusion. Small localized loculated LV posterior . No echo evidence of tamponade.   6. Tissue Doppler not obtained. Respiratory variation and paradoxical septal motion . May suggest constriction. repeat study dedicated study to evaluate for contriction if clinically indicated      < end of copied text >        PAST MEDICAL & SURGICAL HISTORY:  Current smoker    CKD (chronic kidney disease)    HTN (hypertension)    COPD, mild

## 2020-09-20 DIAGNOSIS — E78.5 HYPERLIPIDEMIA, UNSPECIFIED: ICD-10-CM

## 2020-09-20 DIAGNOSIS — R00.0 TACHYCARDIA, UNSPECIFIED: ICD-10-CM

## 2020-09-20 LAB
ANION GAP SERPL CALC-SCNC: 14 MMOL/L — SIGNIFICANT CHANGE UP (ref 5–17)
BUN SERPL-MCNC: 88 MG/DL — HIGH (ref 8–20)
CALCIUM SERPL-MCNC: 9.1 MG/DL — SIGNIFICANT CHANGE UP (ref 8.6–10.2)
CHLORIDE SERPL-SCNC: 95 MMOL/L — LOW (ref 98–107)
CO2 SERPL-SCNC: 25 MMOL/L — SIGNIFICANT CHANGE UP (ref 22–29)
CREAT SERPL-MCNC: 4.71 MG/DL — HIGH (ref 0.5–1.3)
GLUCOSE SERPL-MCNC: 92 MG/DL — SIGNIFICANT CHANGE UP (ref 70–99)
HAV IGM SER-ACNC: SIGNIFICANT CHANGE UP
HAV IGM SER-ACNC: SIGNIFICANT CHANGE UP
HBV CORE AB SER-ACNC: SIGNIFICANT CHANGE UP
HBV CORE IGM SER-ACNC: SIGNIFICANT CHANGE UP
HBV CORE IGM SER-ACNC: SIGNIFICANT CHANGE UP
HBV SURFACE AB SER-ACNC: <3 MIU/ML — LOW
HBV SURFACE AG SER-ACNC: SIGNIFICANT CHANGE UP
HBV SURFACE AG SER-ACNC: SIGNIFICANT CHANGE UP
HCT VFR BLD CALC: 37.4 % — SIGNIFICANT CHANGE UP (ref 34.5–45)
HCV AB S/CO SERPL IA: 0.12 S/CO — SIGNIFICANT CHANGE UP (ref 0–0.99)
HCV AB S/CO SERPL IA: 0.13 S/CO — SIGNIFICANT CHANGE UP (ref 0–0.99)
HCV AB SERPL-IMP: SIGNIFICANT CHANGE UP
HCV AB SERPL-IMP: SIGNIFICANT CHANGE UP
HGB BLD-MCNC: 11.4 G/DL — LOW (ref 11.5–15.5)
MAGNESIUM SERPL-MCNC: 1.9 MG/DL — SIGNIFICANT CHANGE UP (ref 1.8–2.6)
MCHC RBC-ENTMCNC: 28.4 PG — SIGNIFICANT CHANGE UP (ref 27–34)
MCHC RBC-ENTMCNC: 30.5 GM/DL — LOW (ref 32–36)
MCV RBC AUTO: 93.3 FL — SIGNIFICANT CHANGE UP (ref 80–100)
PLATELET # BLD AUTO: 270 K/UL — SIGNIFICANT CHANGE UP (ref 150–400)
POTASSIUM SERPL-MCNC: 5.3 MMOL/L — SIGNIFICANT CHANGE UP (ref 3.5–5.3)
POTASSIUM SERPL-SCNC: 5.3 MMOL/L — SIGNIFICANT CHANGE UP (ref 3.5–5.3)
RBC # BLD: 4.01 M/UL — SIGNIFICANT CHANGE UP (ref 3.8–5.2)
RBC # FLD: 17 % — HIGH (ref 10.3–14.5)
SODIUM SERPL-SCNC: 134 MMOL/L — LOW (ref 135–145)
WBC # BLD: 7.38 K/UL — SIGNIFICANT CHANGE UP (ref 3.8–10.5)
WBC # FLD AUTO: 7.38 K/UL — SIGNIFICANT CHANGE UP (ref 3.8–10.5)

## 2020-09-20 PROCEDURE — 99024 POSTOP FOLLOW-UP VISIT: CPT

## 2020-09-20 PROCEDURE — 99232 SBSQ HOSP IP/OBS MODERATE 35: CPT

## 2020-09-20 PROCEDURE — 99233 SBSQ HOSP IP/OBS HIGH 50: CPT

## 2020-09-20 PROCEDURE — 93986 DUP-SCAN HEMO COMPL UNI STD: CPT | Mod: 26,LT

## 2020-09-20 PROCEDURE — 71045 X-RAY EXAM CHEST 1 VIEW: CPT | Mod: 26

## 2020-09-20 RX ORDER — METOPROLOL TARTRATE 50 MG
50 TABLET ORAL
Refills: 0 | Status: DISCONTINUED | OUTPATIENT
Start: 2020-09-20 | End: 2020-09-21

## 2020-09-20 RX ORDER — METOPROLOL TARTRATE 50 MG
25 TABLET ORAL ONCE
Refills: 0 | Status: COMPLETED | OUTPATIENT
Start: 2020-09-20 | End: 2020-09-20

## 2020-09-20 RX ORDER — OXYCODONE AND ACETAMINOPHEN 5; 325 MG/1; MG/1
1 TABLET ORAL ONCE
Refills: 0 | Status: DISCONTINUED | OUTPATIENT
Start: 2020-09-20 | End: 2020-09-20

## 2020-09-20 RX ORDER — METOPROLOL TARTRATE 50 MG
25 TABLET ORAL THREE TIMES A DAY
Refills: 0 | Status: DISCONTINUED | OUTPATIENT
Start: 2020-09-20 | End: 2020-09-20

## 2020-09-20 RX ADMIN — SODIUM CHLORIDE 3 MILLILITER(S): 9 INJECTION INTRAMUSCULAR; INTRAVENOUS; SUBCUTANEOUS at 21:01

## 2020-09-20 RX ADMIN — HEPARIN SODIUM 5000 UNIT(S): 5000 INJECTION INTRAVENOUS; SUBCUTANEOUS at 21:00

## 2020-09-20 RX ADMIN — MIRTAZAPINE 15 MILLIGRAM(S): 45 TABLET, ORALLY DISINTEGRATING ORAL at 21:00

## 2020-09-20 RX ADMIN — Medication 40 MILLIGRAM(S): at 05:03

## 2020-09-20 RX ADMIN — SODIUM CHLORIDE 3 MILLILITER(S): 9 INJECTION INTRAMUSCULAR; INTRAVENOUS; SUBCUTANEOUS at 04:34

## 2020-09-20 RX ADMIN — SODIUM CHLORIDE 3 MILLILITER(S): 9 INJECTION INTRAMUSCULAR; INTRAVENOUS; SUBCUTANEOUS at 13:00

## 2020-09-20 RX ADMIN — Medication 1 TABLET(S): at 09:21

## 2020-09-20 RX ADMIN — Medication 25 MILLIGRAM(S): at 11:25

## 2020-09-20 RX ADMIN — ATORVASTATIN CALCIUM 40 MILLIGRAM(S): 80 TABLET, FILM COATED ORAL at 21:00

## 2020-09-20 RX ADMIN — PANTOPRAZOLE SODIUM 40 MILLIGRAM(S): 20 TABLET, DELAYED RELEASE ORAL at 05:03

## 2020-09-20 RX ADMIN — OXYCODONE AND ACETAMINOPHEN 1 TABLET(S): 5; 325 TABLET ORAL at 21:24

## 2020-09-20 RX ADMIN — AMLODIPINE BESYLATE 10 MILLIGRAM(S): 2.5 TABLET ORAL at 05:03

## 2020-09-20 RX ADMIN — CALCITRIOL 0.5 MICROGRAM(S): 0.5 CAPSULE ORAL at 09:21

## 2020-09-20 RX ADMIN — OXYCODONE AND ACETAMINOPHEN 1 TABLET(S): 5; 325 TABLET ORAL at 20:54

## 2020-09-20 RX ADMIN — Medication 1 TABLET(S): at 05:03

## 2020-09-20 RX ADMIN — Medication 25 MILLIGRAM(S): at 05:03

## 2020-09-20 RX ADMIN — Medication 1 TABLET(S): at 17:26

## 2020-09-20 RX ADMIN — ALBUTEROL 2 PUFF(S): 90 AEROSOL, METERED ORAL at 21:04

## 2020-09-20 RX ADMIN — HEPARIN SODIUM 5000 UNIT(S): 5000 INJECTION INTRAVENOUS; SUBCUTANEOUS at 09:21

## 2020-09-20 RX ADMIN — Medication 50 MILLIGRAM(S): at 17:26

## 2020-09-20 NOTE — PROGRESS NOTE ADULT - PROBLEM SELECTOR PLAN 8
Resolved  Trend serum potassium levels Resolved  Trend serum potassium levels  Continue Lasix as per renal

## 2020-09-20 NOTE — PROGRESS NOTE ADULT - SUBJECTIVE AND OBJECTIVE BOX
Subjective:  "I just want to go home, do you think it will be a possibility tomorrow?"  Pt. lying in bed, denies any SOB or chest pain, NAD noted.    Tele: sinus tachycardia                  T(C): 36.9 (09-20-20 @ 04:34), Max: 38.5 (09-19-20 @ 17:27)  HR: 106 (09-20-20 @ 04:42) (87 - 111)  BP: 152/93 (09-20-20 @ 04:42) (113/71 - 152/93)  RR: 20 (09-20-20 @ 04:42) (18 - 20)  SpO2: 93% (09-20-20 @ 04:42) (89% - 96%)    09-20    134<L>  |  95<L>  |  88.0<H>  ----------------------------<  92  5.3   |  25.0  |  4.71<H>    Ca    9.1      20 Sep 2020 06:01  Mg     1.9     09-20    TPro  x   /  Alb  x   /  TBili  x   /  DBili  x   /  AST  x   /  ALT  19  /  AlkPhos  x   09-19                               11.4   7.38  )-----------( 270      ( 20 Sep 2020 06:01 )             37.4               CAPILLARY BLOOD GLUCOSE    POCT Blood Glucose.: 108 mg/dL (19 Sep 2020 21:51)         Xray Chest 1 View- PORTABLE-Routine (Xray Chest 1 View- PORTABLE-Routine in AM.) (09.20.20 @ 05:55)     EXAM:  XR CHEST PORTABLE ROUTINE 1V                          PROCEDURE DATE:  09/20/2020      INTERPRETATION:  TECHNIQUE: Single portable view of the chest.    COMPARISON: 9/19/2020    CLINICAL HISTORY: Pericardial effusion ,    FINDINGS:    Single frontal view of the chest demonstrates moderate CHF. Small bilateral pleural effusions. Right axillary midline catheter. The cardiomediastinal silhouette is enlarged. No acute osseous abnormalities. Overlying EKG leads and wires are noted. Pericardial effusion.    IMPRESSION: No interval change.      KRISTAN NOBLES M.D., ATTENDING RADIOLOGIST  This document has been electronically signed. Sep 20 2020 10:06AM     Xray Chest 1 View- PORTABLE-Routine (Xray Chest 1 View- PORTABLE-Routine in AM.) (09.19.20 @ 05:24) >  EXAM:  XR CHEST PORTABLE ROUTINE 1V                          PROCEDURE DATE:  09/19/2020        INTERPRETATION:  TECHNIQUE: Single portable view of the chest.    COMPARISON: 9/17/2020    CLINICAL HISTORY: eval lung fieldspleural effusions b.l    FINDINGS:    Single frontal view of the chest demonstrates mild CHF. Small bilateral pleural effusions, unchanged. Widened mediastinum, unchanged. Consider CT as clinically warranted. Moderate-sized pericardial effusion. The cardiomediastinal silhouette is enlarged. No acute osseous abnormalities. Overlying EKG leads and wires are noted    IMPRESSION: No interval change. CT as clinically warranted.    KRISTAN NOBLES M.D., ATTENDING RADIOLOGIST  This document has been electronically signed. Sep 19 2020  9:27AM    I&O's Detail    19 Sep 2020 07:01  -  20 Sep 2020 07:00  --------------------------------------------------------  IN:    Oral Fluid: 600 mL  Total IN: 600 mL    OUT:    Voided (mL): 1500 mL  Total OUT: 1500 mL    Total NET: -900 mL    BOWEL MOVEMENT:  [x] YES [ ] NO        Drug Dosing Weight  Height (cm): 165.1 (13 Sep 2020 00:15)  Weight (kg): 96.9 (13 Sep 2020 00:15)  BMI (kg/m2): 35.5 (13 Sep 2020 00:15)  BSA (m2): 2.03 (13 Sep 2020 00:15)    ALBUTerol    90 MICROgram(s) HFA Inhaler 2 Puff(s) Inhalation every 6 hours  amLODIPine   Tablet 10 milliGRAM(s) Oral daily  atorvastatin 40 milliGRAM(s) Oral at bedtime  calcitriol   Capsule 0.5 MICROGram(s) Oral daily  ergocalciferol 09835 Unit(s) Oral <User Schedule>  furosemide   Injectable 40 milliGRAM(s) IV Push daily  metoprolol tartrate 25 milliGRAM(s) Oral two times a day  Nephro-vanna 1 Tablet(s) Oral daily  pantoprazole    Tablet 40 milliGRAM(s) Oral before breakfast  sodium chloride 0.9% lock flush 3 milliLiter(s) IV Push every 8 hours

## 2020-09-20 NOTE — PROGRESS NOTE ADULT - SUBJECTIVE AND OBJECTIVE BOX
Patient seen and examined . Sitting up in the bed , no complaints , no sob , chest pain . Wants to go Home .     CC : sob - not present today       MEDICATIONS  (STANDING):  ALBUTerol    90 MICROgram(s) HFA Inhaler 2 Puff(s) Inhalation every 6 hours  amLODIPine   Tablet 10 milliGRAM(s) Oral daily  atorvastatin 40 milliGRAM(s) Oral at bedtime  calcitriol   Capsule 0.5 MICROGram(s) Oral daily  ergocalciferol 85197 Unit(s) Oral <User Schedule>  fluticasone propionate 50 MICROgram(s)/spray Nasal Spray 1 Spray(s) Both Nostrils two times a day  furosemide   Injectable 40 milliGRAM(s) IV Push daily  heparin   Injectable 5000 Unit(s) SubCutaneous every 12 hours  influenza   Vaccine 0.5 milliLiter(s) IntraMuscular once  lactobacillus acidophilus 1 Tablet(s) Oral two times a day  metoprolol tartrate 50 milliGRAM(s) Oral two times a day  mirtazapine Soltab 15 milliGRAM(s) Oral at bedtime  Nephro-vanna 1 Tablet(s) Oral daily  nicotine - 21 mG/24Hr(s) Patch 1 patch Transdermal daily  pantoprazole    Tablet 40 milliGRAM(s) Oral before breakfast  QUEtiapine 300 milliGRAM(s) Oral daily  sodium chloride 0.9% lock flush 3 milliLiter(s) IV Push every 8 hours    MEDICATIONS  (PRN):  melatonin 5 milliGRAM(s) Oral at bedtime PRN Insomnia      LABS:                          11.4   7.38  )-----------( 270      ( 20 Sep 2020 06:01 )             37.4     09-20    134<L>  |  95<L>  |  88.0<H>  ----------------------------<  92  5.3   |  25.0  |  4.71<H>  Hepatitis B Core Antibody, Total: Nonreact (20 @ 01:29)      Ca    9.1      20 Sep 2020 06:01  Mg     1.9         TPro  x   /  Alb  x   /  TBili  x   /  DBili  x   /  AST  x   /  ALT  19  /  AlkPhos  x       Hepatitis C Virus Interpretation: Nonreact: Hepatitis C AB  S/CO Ratio                        Interpretation  < 1.00                                   Non-Reactive  1.00 - 4.99                         Weakly-Reactive  >= 5.00                                Reactive  Non-Reactive: A person witha non-reactive HCV antibody result is  considered uninfected.  No further action is needed unless recent  infection is suspected.  In these cases, consider repeat testing later to  detect seroconversion..  Weakly-Reactive: HCV antibody test is abnormal, HCV RNA Qualitative test  will follow.  Reactive: HCV antibody test is abnormal, HCV RNA Qualitative test will  follow.  Note: HCV antibody testing is performed on the [employer]  system. (20 @ 01:29)    Hepatitis B Core IgM Antibody: Nonreact (20 @ :29)    Hepatitis B Core Antibody, Total: Nonreact (20 @ :29)    Hepatitis B Surface AB Quantitative: <3.0:  HEPBSAB Quant Interpretation:            >= 12.0   Immune            8.0-11.9  Borderline            < 8.0       Non-Immune mIU/mL (20 @ 01:29)    Hepatitis B Surface Antigen: Nonreact (20:29)              Urinalysis Basic - ( 19 Sep 2020 21:44 )    Color: Yellow / Appearance: Clear / S.015 / pH: x  Gluc: x / Ketone: Negative  / Bili: Negative / Urobili: Negative mg/dL   Blood: x / Protein: 100 mg/dL / Nitrite: Negative   Leuk Esterase: Negative / RBC: 0-2 /HPF / WBC Negative   Sq Epi: x / Non Sq Epi: Occasional / Bacteria: x        RADIOLOGY & ADDITIONAL TESTS:  < from: Xray Chest 1 View- PORTABLE-Routine (Xray Chest 1 View- PORTABLE-Routine in AM.) (20 @ 05:55) >     EXAM:  XR CHEST PORTABLE ROUTINE 1V                          PROCEDURE DATE:  2020          INTERPRETATION:  TECHNIQUE: Single portable view of the chest.    COMPARISON: 2020    CLINICAL HISTORY: Pericardial effusionPericardial effusion    FINDINGS:    Single frontal view of the chest demonstrates moderate CHF. Small bilateral pleural effusions. Right axillary midline catheter. The cardiomediastinal silhouette is enlarged. No acute osseous abnormalities. Overlying EKG leads and wires are noted. Pericardial effusion.    IMPRESSION: No interval change.      KRISTAN NOBLES M.D., ATTENDING RADIOLOGIST  This document has been electronically signed. Sep 20 2020 10:06AM    < end of copied text >            REVIEW OF SYSTEMS:    SOB - not present today , all other systems are reviewed and are negative .     Vital Signs Last 24 Hrs  T(C): 37.3 (20 Sep 2020 09:50), Max: 38.5 (19 Sep 2020 17:27)  T(F): 99.1 (20 Sep 2020 09:50), Max: 101.3 (19 Sep 2020 17:27)  HR: 102 (20 Sep 2020 11:25) (87 - 109)  BP: 125/97 (20 Sep 2020 11:25) (118/85 - 152/93)  BP(mean): --  RR: 18 (20 Sep 2020 09:50) (18 - 20)  SpO2: 96% (20 Sep 2020 09:50) (89% - 96%)    PHYSICAL EXAM:    GENERAL: NAD, well-groomed, well-developed  HEAD:  Atraumatic, Normocephalic  EYES: EOMI, PERRLA, conjunctiva and sclera clear  NECK: Supple, No JVD, Normal thyroid  NERVOUS SYSTEM:  Alert & Oriented X3, no focal deficit  CHEST/LUNG: CTA b/l ,  no  rales, rhonchi, wheezing, or rubs  HEART: Regular rate and rhythm; No murmurs, rubs, or gallops  ABDOMEN: Soft, Nontender, Nondistended; Bowel sounds present  EXTREMITIES:  2+ Peripheral Pulses, No clubbing, cyanosis, or edema  LYMPH: No lymphadenopathy noted  SKIN: No rashes or lesions

## 2020-09-20 NOTE — PROGRESS NOTE ADULT - PROBLEM SELECTOR PLAN 3
Continue consistent DASH consistent carb renal diet  Continue Norvasc & lopressor Continue consistent DASH consistent carb renal diet  Lopressor increased to 50mg BID, continue Norvasc & lopressor
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
## 2020-09-20 NOTE — PROGRESS NOTE ADULT - PROBLEM SELECTOR PLAN 4
Renal following   BUN/Creat uptrending, renal following, pre op work up for hemodialysis initiated, PPD to be read on 9/21. Vessel mapping pending   Will repeat BMP in AM  hyperkalemia, renal following, received Lokelma (9/14)    Placed on Lasix gtt on 9/17 d/c yesterday (9/18), currently on 40 mg IV daily  Will repeat BMP in AM  Continue to hold nephrotoxic medication

## 2020-09-20 NOTE — PROGRESS NOTE ADULT - PROBLEM SELECTOR PLAN 1
Now s/p pericardiocentesis on 9/13, s/p removal of drain on 9/16  Currently hemodynamically stable  Continue Tylenol PRN for pain management  Will be discharge on home oxygen  Discussed plan with Dr. Kwon Now s/p pericardiocentesis on 9/13, s/p removal of drain on 9/16  Currently hemodynamically stable  Continue Tylenol PRN for pain management  Discharge to home with oxygen possibly tomorrow, pending renal plan for HD.  Overnight patient had desaturation, echo discontinued as per Dr. Kwon.  Discussed plan with Dr. Kwon

## 2020-09-20 NOTE — PROGRESS NOTE ADULT - PROBLEM SELECTOR PLAN 1
Now s/p pericardiocentesis on 9/13, drain removed 9/16  Currently stable hemodynamically   Diuresis as per renal, Lasix gtt was d/c on 9/18 and now on 40 mg IV daily  Repeat BMP, CBC in AM  Encourage OOB, ambulate, chest PT
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
## 2020-09-20 NOTE — PROGRESS NOTE ADULT - NUTRITIONAL ASSESSMENT
#1 Altered Nutrition Related Lab Values.     Recommendation:                Diet, Consistent Carbohydrate w/Evening Snack, DASH/TLC {Sodium & Cholesteral Restricted}  	For patients receiving Renal Replacement - No Protein Restr, No Conc K, No Conc Phos, Low  Sodium (RENAL)  	No Concentrated Potassium,                RX: Nephro-vanna daily
#1 Altered Nutrition Related Lab Values.     Recommendation:                Diet, Consistent Carbohydrate w/Evening Snack, DASH/TLC {Sodium & Cholesteral Restricted}  	For patients receiving Renal Replacement - No Protein Restr, No Conc K, No Conc Phos, Low  Sodium (RENAL)  	No Concentrated Potassium,                RX: Nephro-vanna daily
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
## 2020-09-20 NOTE — PROGRESS NOTE ADULT - SUBJECTIVE AND OBJECTIVE BOX
Subjective: Patient lying in bed, no acute distress noted, denies chest pain, shortness of breath, abdominal pain, N/V/D.     VITAL SIGNS  Vital Signs Last 24 Hrs  T(C): 37.1 (20 @ 21:00), Max: 38.5 (20 @ 17:27)  T(F): 98.8 (20 @ 21:00), Max: 101.3 (20 @ 17:27)  HR: 96 (20 @ 21:00) (90 - 111)  BP: 118/85 (20 @ 21:00) (113/71 - 128/82)  RR: 20 (20 @ 21:00) (18 - 20)  SpO2: 89% (20 @ 21:00) (89% - 96%)  on 3L O2    Telemetry/Alarms:  SR    MEDICATIONS  ALBUTerol    90 MICROgram(s) HFA Inhaler 2 Puff(s) Inhalation every 6 hours  amLODIPine   Tablet 10 milliGRAM(s) Oral daily  atorvastatin 40 milliGRAM(s) Oral at bedtime  calcitriol   Capsule 0.5 MICROGram(s) Oral daily  ergocalciferol 24779 Unit(s) Oral <User Schedule>  fluticasone propionate 50 MICROgram(s)/spray Nasal Spray 1 Spray(s) Both Nostrils two times a day  furosemide   Injectable 40 milliGRAM(s) IV Push daily  heparin   Injectable 5000 Unit(s) SubCutaneous every 12 hours  influenza   Vaccine 0.5 milliLiter(s) IntraMuscular once  lactobacillus acidophilus 1 Tablet(s) Oral two times a day  melatonin 5 milliGRAM(s) Oral at bedtime PRN  metoprolol tartrate 25 milliGRAM(s) Oral two times a day  mirtazapine Soltab 15 milliGRAM(s) Oral at bedtime  Nephro-vanna 1 Tablet(s) Oral daily  nicotine - 21 mG/24Hr(s) Patch 1 patch Transdermal daily  pantoprazole    Tablet 40 milliGRAM(s) Oral before breakfast  QUEtiapine 300 milliGRAM(s) Oral daily  sodium chloride 0.9% lock flush 3 milliLiter(s) IV Push every 8 hours      PHYSICAL EXAM  General: well nourished, well developed, no acute distress  Neurology: alert and oriented x 3, nonfocal, no gross deficits  Respiratory: diminished breath sounds at the bases bilaterally, no accessory muscle use noted.   CV: regular rate and rhythm, normal S1, S2  Abdomen: soft, nontender, nondistended, positive bowel sounds  Extremities: warm, well perfused. +1 BLE edema L>R. + DP pulses bilaterally  Psych: Appropriate mood and affect       @ 07:01  -   @ 07:00  --------------------------------------------------------  IN: 0 mL / OUT: 500 mL / NET: -500 mL     @ 07:01  -   @ 00:54  --------------------------------------------------------  IN: 120 mL / OUT: 650 mL / NET: -530 mL        Weights:  Daily     Daily Weight in k.8 (19 Sep 2020 04:44)  Admit Wt: Drug Dosing Weight  Height (cm): 165.1 (13 Sep 2020 00:15)  Weight (kg): 96.9 (13 Sep 2020 00:15)  BMI (kg/m2): 35.5 (13 Sep 2020 00:15)  BSA (m2): 2.03 (13 Sep 2020 00:15)    All laboratory results, radiology and medications reviewed.    LABS      130<L>  |  90<L>  |  81.0<H>  ----------------------------<  79  4.8   |  26.0  |  4.53<H>    Ca    9.2      19 Sep 2020 16:03  Phos  6.5       Mg     1.8         TPro  x   /  Alb  x   /  TBili  x   /  DBili  x   /  AST  x   /  ALT  19  /  AlkPhos  x                                    11.6   9.15  )-----------( 267      ( 19 Sep 2020 06:09 )             37.9              CAPILLARY BLOOD GLUCOSE      POCT Blood Glucose.: 108 mg/dL (19 Sep 2020 21:51)           < from: Xray Chest 1 View- PORTABLE-Routine (Xray Chest 1 View- PORTABLE-Routine in AM.) (20 @ 05:24) >  robert frontal view of the chest demonstrates mild CHF. Small bilateral pleural effusions, unchanged. Widened mediastinum, unchanged. Consider CT as clinically warranted. Moderate-sized pericardial effusion. The cardiomediastinal silhouette is enlarged. No acute osseous abnormalities. Overlying EKG leads and wires are noted    IMPRESSION: No interval change. CT as clinically warranted.      < end of copied text >    < from: TTE Echo Limited or F/U (09.15.20 @ 10:34) >    Summary:   1. S/P pericardiocentesis. Pericardial drain still in place as per clinical history.   2. Hyperdynamic wall motion.   3. Left ventricular ejection fraction, by visual estimation, is >75%.   4. Normal right ventricular size and function.   5. Small pericardial effusion. Small localized loculated LV posterior . No echo evidence of tamponade.   6. Tissue Doppler not obtained. Respiratory variation and paradoxical septal motion . May suggest constriction. repeat study dedicated study to evaluate for contriction if clinically indicated      < end of copied text >    PAST MEDICAL & SURGICAL HISTORY:  Current smoker    CKD (chronic kidney disease)    HTN (hypertension)    COPD, mild

## 2020-09-20 NOTE — PROGRESS NOTE ADULT - PROBLEM SELECTOR PLAN 7
Remains afebrile  Continue Ceftriaxone & Doxy completed  ID consulted Febrile yesterday, blood culture spending  Remains afebrile  ID consulted Ceftriaxone & Doxy completed on 9/17/2020

## 2020-09-20 NOTE — PROGRESS NOTE ADULT - PROBLEM SELECTOR PLAN 4
Will need HD as per Renal   Hepatitis panel completed  BUN/Creat unchanged, trend renal function  Continue to hold nephrotoxic medication  PPD implanted on 9/19 to left forearm, to be read on 9/21/2020 Will need HD as per Renal   Hepatitis panel completed  BUN/Creat unchanged, trend renal function  Continue lasix   Strict I/O's  Continue Nephrovite  Continue to hold nephrotoxic medication  PPD implanted on 9/19 to left forearm, to be read on 9/21/2020

## 2020-09-20 NOTE — PROGRESS NOTE ADULT - SUBJECTIVE AND OBJECTIVE BOX
Subjective:    Tele: sinus tachycardia              T(C): 36.9 (09-20-20 @ 04:34), Max: 38.5 (09-19-20 @ 17:27)  HR: 106 (09-20-20 @ 04:42) (87 - 111)  BP: 152/93 (09-20-20 @ 04:42) (113/71 - 152/93)  ABP: --  ABP(mean): --  RR: 20 (09-20-20 @ 04:42) (18 - 20)  SpO2: 93% (09-20-20 @ 04:42) (89% - 96%)      09-20    134<L>  |  95<L>  |  88.0<H>  ----------------------------<  92  5.3   |  25.0  |  4.71<H>    Ca    9.1      20 Sep 2020 06:01  Mg     1.9     09-20    TPro  x   /  Alb  x   /  TBili  x   /  DBili  x   /  AST  x   /  ALT  19  /  AlkPhos  x   09-19                               11.4   7.38  )-----------( 270      ( 20 Sep 2020 06:01 )             37.4               CAPILLARY BLOOD GLUCOSE      POCT Blood Glucose.: 108 mg/dL (19 Sep 2020 21:51)           CXR:< from: Xray Chest 1 View- PORTABLE-Routine (Xray Chest 1 View- PORTABLE-Routine in AM.) (09.19.20 @ 05:24) >  EXAM:  XR CHEST PORTABLE ROUTINE 1V                          PROCEDURE DATE:  09/19/2020          INTERPRETATION:  TECHNIQUE: Single portable view of the chest.    COMPARISON: 9/17/2020    CLINICAL HISTORY: eval lung fieldspleural effusions b.l    FINDINGS:    Single frontal view of the chest demonstrates mild CHF. Small bilateral pleural effusions, unchanged. Widened mediastinum, unchanged. Consider CT as clinically warranted. Moderate-sized pericardial effusion. The cardiomediastinal silhouette is enlarged. No acute osseous abnormalities. Overlying EKG leads and wires are noted    IMPRESSION: No interval change. CT as clinically warranted.              KRISTAN NOBLES M.D., ATTENDING RADIOLOGIST  This document has been electronically signed. Sep 19 2020  9:27AM    < end of copied text >      I&O's Detail    19 Sep 2020 07:01  -  20 Sep 2020 07:00  --------------------------------------------------------  IN:    Oral Fluid: 600 mL  Total IN: 600 mL    OUT:    Voided (mL): 1500 mL  Total OUT: 1500 mL    Total NET: -900 mL          CHEST TUBE:  [ ] YES [ ] NO  OUTPUT:     per 24 hours    AIR LEAK:  [ ] YES [ ] NO    EPICARDIAL WIRES REMOVED: Yes[ ]No[ ]  BOWEL MOVEMENT:  [ ] YES [ ] NO        Drug Dosing Weight  Height (cm): 165.1 (13 Sep 2020 00:15)  Weight (kg): 96.9 (13 Sep 2020 00:15)  BMI (kg/m2): 35.5 (13 Sep 2020 00:15)  BSA (m2): 2.03 (13 Sep 2020 00:15)    ALBUTerol    90 MICROgram(s) HFA Inhaler 2 Puff(s) Inhalation every 6 hours  amLODIPine   Tablet 10 milliGRAM(s) Oral daily  atorvastatin 40 milliGRAM(s) Oral at bedtime  calcitriol   Capsule 0.5 MICROGram(s) Oral daily  ergocalciferol 69764 Unit(s) Oral <User Schedule>  furosemide   Injectable 40 milliGRAM(s) IV Push daily  metoprolol tartrate 25 milliGRAM(s) Oral two times a day  Nephro-vanna 1 Tablet(s) Oral daily  pantoprazole    Tablet 40 milliGRAM(s) Oral before breakfast  sodium chloride 0.9% lock flush 3 milliLiter(s) IV Push every 8 hours      Assessment  General: WN/WD NAD  Neurology: A&Ox3, nonfocal, BELLO x 4  Respiratory: CTA B/L  CV: RRR, S1S2, no murmurs, rubs or gallops  Abdominal: Soft, NT, ND +BS, Last BM  Extremities: No edema, + peripheral pulses  Incisions: MSI Healing Well, Sternum Stable    Assessment:  55yFemale with PAST MEDICAL & SURGICAL HISTORY:  Current smoker    CKD (chronic kidney disease)    HTN (hypertension)    COPD, mild             Subjective:  "I just want to go home, do you think it will be a possibility tomorrow?"  Pt. lying in bed, denies any SOB or chest pain, NAD noted.    Tele: sinus tachycardia                T(C): 36.9 (09-20-20 @ 04:34), Max: 38.5 (09-19-20 @ 17:27)  HR: 106 (09-20-20 @ 04:42) (87 - 111)  BP: 152/93 (09-20-20 @ 04:42) (113/71 - 152/93)  RR: 20 (09-20-20 @ 04:42) (18 - 20)  SpO2: 93% (09-20-20 @ 04:42) (89% - 96%)      09-20    134<L>  |  95<L>  |  88.0<H>  ----------------------------<  92  5.3   |  25.0  |  4.71<H>    Ca    9.1      20 Sep 2020 06:01  Mg     1.9     09-20    TPro  x   /  Alb  x   /  TBili  x   /  DBili  x   /  AST  x   /  ALT  19  /  AlkPhos  x   09-19                               11.4   7.38  )-----------( 270      ( 20 Sep 2020 06:01 )             37.4               CAPILLARY BLOOD GLUCOSE      POCT Blood Glucose.: 108 mg/dL (19 Sep 2020 21:51)           CXR:< from: Xray Chest 1 View- PORTABLE-Routine (Xray Chest 1 View- PORTABLE-Routine in AM.) (09.19.20 @ 05:24) >  EXAM:  XR CHEST PORTABLE ROUTINE 1V                          PROCEDURE DATE:  09/19/2020          INTERPRETATION:  TECHNIQUE: Single portable view of the chest.    COMPARISON: 9/17/2020    CLINICAL HISTORY: eval lung fieldspleural effusions b.l    FINDINGS:    Single frontal view of the chest demonstrates mild CHF. Small bilateral pleural effusions, unchanged. Widened mediastinum, unchanged. Consider CT as clinically warranted. Moderate-sized pericardial effusion. The cardiomediastinal silhouette is enlarged. No acute osseous abnormalities. Overlying EKG leads and wires are noted    IMPRESSION: No interval change. CT as clinically warranted.              KRISTAN NOBLES M.D., ATTENDING RADIOLOGIST  This document has been electronically signed. Sep 19 2020  9:27AM    < end of copied text >      I&O's Detail    19 Sep 2020 07:01  -  20 Sep 2020 07:00  --------------------------------------------------------  IN:    Oral Fluid: 600 mL  Total IN: 600 mL    OUT:    Voided (mL): 1500 mL  Total OUT: 1500 mL    Total NET: -900 mL    BOWEL MOVEMENT:  [x] YES [ ] NO        Drug Dosing Weight  Height (cm): 165.1 (13 Sep 2020 00:15)  Weight (kg): 96.9 (13 Sep 2020 00:15)  BMI (kg/m2): 35.5 (13 Sep 2020 00:15)  BSA (m2): 2.03 (13 Sep 2020 00:15)    ALBUTerol    90 MICROgram(s) HFA Inhaler 2 Puff(s) Inhalation every 6 hours  amLODIPine   Tablet 10 milliGRAM(s) Oral daily  atorvastatin 40 milliGRAM(s) Oral at bedtime  calcitriol   Capsule 0.5 MICROGram(s) Oral daily  ergocalciferol 49284 Unit(s) Oral <User Schedule>  furosemide   Injectable 40 milliGRAM(s) IV Push daily  metoprolol tartrate 25 milliGRAM(s) Oral two times a day  Nephro-vanna 1 Tablet(s) Oral daily  pantoprazole    Tablet 40 milliGRAM(s) Oral before breakfast  sodium chloride 0.9% lock flush 3 milliLiter(s) IV Push every 8 hours      Assessment  General: WN/WD NAD  Neurology: A&Ox3, nonfocal, BELLO x 4  Respiratory: Diminished at the bases B/L  CV: RRR, S1S2, no murmurs, rubs or gallops  Abdominal: Soft, obese NT, ND +BS, Last BM 9/19/2020  Extremities: +1 ankle LLE>RLE, + peripheral pulses    PAST MEDICAL & SURGICAL HISTORY:    Current smoker    CKD (chronic kidney disease)    HTN (hypertension)    COPD, mild             Subjective:  "I just want to go home, do you think it will be a possibility tomorrow?"  Pt. lying in bed, denies any SOB or chest pain, NAD noted.    Tele: sinus tachycardia                T(C): 36.9 (09-20-20 @ 04:34), Max: 38.5 (09-19-20 @ 17:27)  HR: 106 (09-20-20 @ 04:42) (87 - 111)  BP: 152/93 (09-20-20 @ 04:42) (113/71 - 152/93)  RR: 20 (09-20-20 @ 04:42) (18 - 20)  SpO2: 93% (09-20-20 @ 04:42) (89% - 96%)      09-20    134<L>  |  95<L>  |  88.0<H>  ----------------------------<  92  5.3   |  25.0  |  4.71<H>    Ca    9.1      20 Sep 2020 06:01  Mg     1.9     09-20    TPro  x   /  Alb  x   /  TBili  x   /  DBili  x   /  AST  x   /  ALT  19  /  AlkPhos  x   09-19                               11.4   7.38  )-----------( 270      ( 20 Sep 2020 06:01 )             37.4               CAPILLARY BLOOD GLUCOSE      POCT Blood Glucose.: 108 mg/dL (19 Sep 2020 21:51)         < from: Xray Chest 1 View- PORTABLE-Routine (Xray Chest 1 View- PORTABLE-Routine in AM.) (09.20.20 @ 05:55) >  EXAM:  XR CHEST PORTABLE ROUTINE 1V                          PROCEDURE DATE:  09/20/2020          INTERPRETATION:  TECHNIQUE: Single portable view of the chest.    COMPARISON: 9/19/2020    CLINICAL HISTORY: Pericardial effusionPericardial effusion    FINDINGS:    Single frontal view of the chest demonstrates moderate CHF. Small bilateral pleural effusions. Right axillary midline catheter. The cardiomediastinal silhouette is enlarged. No acute osseous abnormalities. Overlying EKG leads and wires are noted. Pericardial effusion.    IMPRESSION: No interval change.              KRISTAN NOBLES M.D., ATTENDING RADIOLOGIST  This document has been electronically signed. Sep 20 2020 10:06AM    < end of copied text >      CXR:< from: Xray Chest 1 View- PORTABLE-Routine (Xray Chest 1 View- PORTABLE-Routine in AM.) (09.19.20 @ 05:24) >  EXAM:  XR CHEST PORTABLE ROUTINE 1V                          PROCEDURE DATE:  09/19/2020          INTERPRETATION:  TECHNIQUE: Single portable view of the chest.    COMPARISON: 9/17/2020    CLINICAL HISTORY: eval lung fieldspleural effusions b.l    FINDINGS:    Single frontal view of the chest demonstrates mild CHF. Small bilateral pleural effusions, unchanged. Widened mediastinum, unchanged. Consider CT as clinically warranted. Moderate-sized pericardial effusion. The cardiomediastinal silhouette is enlarged. No acute osseous abnormalities. Overlying EKG leads and wires are noted    IMPRESSION: No interval change. CT as clinically warranted.    KRISTAN NOBLES M.D., ATTENDING RADIOLOGIST  This document has been electronically signed. Sep 19 2020  9:27AM    < end of copied text >      I&O's Detail    19 Sep 2020 07:01  -  20 Sep 2020 07:00  --------------------------------------------------------  IN:    Oral Fluid: 600 mL  Total IN: 600 mL    OUT:    Voided (mL): 1500 mL  Total OUT: 1500 mL    Total NET: -900 mL    BOWEL MOVEMENT:  [x] YES [ ] NO        Drug Dosing Weight  Height (cm): 165.1 (13 Sep 2020 00:15)  Weight (kg): 96.9 (13 Sep 2020 00:15)  BMI (kg/m2): 35.5 (13 Sep 2020 00:15)  BSA (m2): 2.03 (13 Sep 2020 00:15)    ALBUTerol    90 MICROgram(s) HFA Inhaler 2 Puff(s) Inhalation every 6 hours  amLODIPine   Tablet 10 milliGRAM(s) Oral daily  atorvastatin 40 milliGRAM(s) Oral at bedtime  calcitriol   Capsule 0.5 MICROGram(s) Oral daily  ergocalciferol 41022 Unit(s) Oral <User Schedule>  furosemide   Injectable 40 milliGRAM(s) IV Push daily  metoprolol tartrate 25 milliGRAM(s) Oral two times a day  Nephro-vanna 1 Tablet(s) Oral daily  pantoprazole    Tablet 40 milliGRAM(s) Oral before breakfast  sodium chloride 0.9% lock flush 3 milliLiter(s) IV Push every 8 hours      Assessment  General: WN/WD NAD  Neurology: A&Ox3, nonfocal, BELLO x 4  Respiratory: Diminished at the bases B/L  CV: RRR, S1S2, no murmurs, rubs or gallops  Abdominal: Soft, obese NT, ND +BS, Last BM 9/19/2020  Extremities: +1 ankle LLE>RLE, + peripheral pulses    PAST MEDICAL & SURGICAL HISTORY:  Current smoker  CKD (chronic kidney disease)  HTN (hypertension)  COPD, mild

## 2020-09-20 NOTE — PROGRESS NOTE ADULT - PROBLEM SELECTOR PLAN 7
Completed Ceftriaxone & Doxy for presumed CAP  Temp 101.3 rectally on 9/19, blood c/s x2 and UA sent  Shadi consider ID consult in AM  Plan to discuss with Thoracic team on AM rounds.
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
## 2020-09-21 VITALS
TEMPERATURE: 98 F | RESPIRATION RATE: 18 BRPM | HEART RATE: 92 BPM | SYSTOLIC BLOOD PRESSURE: 126 MMHG | DIASTOLIC BLOOD PRESSURE: 86 MMHG | OXYGEN SATURATION: 93 %

## 2020-09-21 LAB
ALBUMIN SERPL ELPH-MCNC: 3 G/DL — LOW (ref 3.3–5.2)
ALP SERPL-CCNC: 93 U/L — SIGNIFICANT CHANGE UP (ref 40–120)
ALT FLD-CCNC: 23 U/L — SIGNIFICANT CHANGE UP
ANION GAP SERPL CALC-SCNC: 15 MMOL/L — SIGNIFICANT CHANGE UP (ref 5–17)
AST SERPL-CCNC: 24 U/L — SIGNIFICANT CHANGE UP
BILIRUB SERPL-MCNC: <0.2 MG/DL — LOW (ref 0.4–2)
BUN SERPL-MCNC: 90 MG/DL — HIGH (ref 8–20)
CALCIUM SERPL-MCNC: 9.4 MG/DL — SIGNIFICANT CHANGE UP (ref 8.6–10.2)
CHLORIDE SERPL-SCNC: 94 MMOL/L — LOW (ref 98–107)
CO2 SERPL-SCNC: 26 MMOL/L — SIGNIFICANT CHANGE UP (ref 22–29)
CREAT SERPL-MCNC: 4.25 MG/DL — HIGH (ref 0.5–1.3)
GLUCOSE SERPL-MCNC: 120 MG/DL — HIGH (ref 70–99)
HCT VFR BLD CALC: 36.4 % — SIGNIFICANT CHANGE UP (ref 34.5–45)
HGB BLD-MCNC: 11.4 G/DL — LOW (ref 11.5–15.5)
MAGNESIUM SERPL-MCNC: 1.9 MG/DL — SIGNIFICANT CHANGE UP (ref 1.6–2.6)
MCHC RBC-ENTMCNC: 28.9 PG — SIGNIFICANT CHANGE UP (ref 27–34)
MCHC RBC-ENTMCNC: 31.3 GM/DL — LOW (ref 32–36)
MCV RBC AUTO: 92.2 FL — SIGNIFICANT CHANGE UP (ref 80–100)
PHOSPHATE SERPL-MCNC: 5.1 MG/DL — HIGH (ref 2.4–4.7)
PLATELET # BLD AUTO: 299 K/UL — SIGNIFICANT CHANGE UP (ref 150–400)
POTASSIUM SERPL-MCNC: 5.3 MMOL/L — SIGNIFICANT CHANGE UP (ref 3.5–5.3)
POTASSIUM SERPL-SCNC: 5.3 MMOL/L — SIGNIFICANT CHANGE UP (ref 3.5–5.3)
PROT SERPL-MCNC: 6.7 G/DL — SIGNIFICANT CHANGE UP (ref 6.6–8.7)
RBC # BLD: 3.95 M/UL — SIGNIFICANT CHANGE UP (ref 3.8–5.2)
RBC # FLD: 16.5 % — HIGH (ref 10.3–14.5)
SODIUM SERPL-SCNC: 135 MMOL/L — SIGNIFICANT CHANGE UP (ref 135–145)
WBC # BLD: 6.97 K/UL — SIGNIFICANT CHANGE UP (ref 3.8–10.5)
WBC # FLD AUTO: 6.97 K/UL — SIGNIFICANT CHANGE UP (ref 3.8–10.5)

## 2020-09-21 PROCEDURE — 83970 ASSAY OF PARATHORMONE: CPT

## 2020-09-21 PROCEDURE — 97163 PT EVAL HIGH COMPLEX 45 MIN: CPT

## 2020-09-21 PROCEDURE — 80048 BASIC METABOLIC PNL TOTAL CA: CPT

## 2020-09-21 PROCEDURE — 83880 ASSAY OF NATRIURETIC PEPTIDE: CPT

## 2020-09-21 PROCEDURE — 94640 AIRWAY INHALATION TREATMENT: CPT

## 2020-09-21 PROCEDURE — 86706 HEP B SURFACE ANTIBODY: CPT

## 2020-09-21 PROCEDURE — 87252 VIRUS INOCULATION TISSUE: CPT

## 2020-09-21 PROCEDURE — 82310 ASSAY OF CALCIUM: CPT

## 2020-09-21 PROCEDURE — 87205 SMEAR GRAM STAIN: CPT

## 2020-09-21 PROCEDURE — 93308 TTE F-UP OR LMTD: CPT

## 2020-09-21 PROCEDURE — 84156 ASSAY OF PROTEIN URINE: CPT

## 2020-09-21 PROCEDURE — 84460 ALANINE AMINO (ALT) (SGPT): CPT

## 2020-09-21 PROCEDURE — 76770 US EXAM ABDO BACK WALL COMP: CPT

## 2020-09-21 PROCEDURE — 87206 SMEAR FLUORESCENT/ACID STAI: CPT

## 2020-09-21 PROCEDURE — 85027 COMPLETE CBC AUTOMATED: CPT

## 2020-09-21 PROCEDURE — 80053 COMPREHEN METABOLIC PANEL: CPT

## 2020-09-21 PROCEDURE — 86850 RBC ANTIBODY SCREEN: CPT

## 2020-09-21 PROCEDURE — 87641 MR-STAPH DNA AMP PROBE: CPT

## 2020-09-21 PROCEDURE — 84133 ASSAY OF URINE POTASSIUM: CPT

## 2020-09-21 PROCEDURE — 81401 MOPATH PROCEDURE LEVEL 2: CPT

## 2020-09-21 PROCEDURE — 93986 DUP-SCAN HEMO COMPL UNI STD: CPT

## 2020-09-21 PROCEDURE — 88112 CYTOPATH CELL ENHANCE TECH: CPT

## 2020-09-21 PROCEDURE — 83735 ASSAY OF MAGNESIUM: CPT

## 2020-09-21 PROCEDURE — 36415 COLL VENOUS BLD VENIPUNCTURE: CPT

## 2020-09-21 PROCEDURE — 86704 HEP B CORE ANTIBODY TOTAL: CPT

## 2020-09-21 PROCEDURE — C1769: CPT

## 2020-09-21 PROCEDURE — 82306 VITAMIN D 25 HYDROXY: CPT

## 2020-09-21 PROCEDURE — 85025 COMPLETE CBC W/AUTO DIFF WBC: CPT

## 2020-09-21 PROCEDURE — 87102 FUNGUS ISOLATION CULTURE: CPT

## 2020-09-21 PROCEDURE — 85610 PROTHROMBIN TIME: CPT

## 2020-09-21 PROCEDURE — 88305 TISSUE EXAM BY PATHOLOGIST: CPT

## 2020-09-21 PROCEDURE — ZZZZZ: CPT

## 2020-09-21 PROCEDURE — 94760 N-INVAS EAR/PLS OXIMETRY 1: CPT

## 2020-09-21 PROCEDURE — 71045 X-RAY EXAM CHEST 1 VIEW: CPT | Mod: 26

## 2020-09-21 PROCEDURE — 99232 SBSQ HOSP IP/OBS MODERATE 35: CPT

## 2020-09-21 PROCEDURE — 87075 CULTR BACTERIA EXCEPT BLOOD: CPT

## 2020-09-21 PROCEDURE — 84100 ASSAY OF PHOSPHORUS: CPT

## 2020-09-21 PROCEDURE — 87040 BLOOD CULTURE FOR BACTERIA: CPT

## 2020-09-21 PROCEDURE — 86705 HEP B CORE ANTIBODY IGM: CPT

## 2020-09-21 PROCEDURE — 99153 MOD SED SAME PHYS/QHP EA: CPT

## 2020-09-21 PROCEDURE — 89051 BODY FLUID CELL COUNT: CPT

## 2020-09-21 PROCEDURE — 99239 HOSP IP/OBS DSCHRG MGMT >30: CPT

## 2020-09-21 PROCEDURE — 88341 IMHCHEM/IMCYTCHM EA ADD ANTB: CPT

## 2020-09-21 PROCEDURE — C8929: CPT

## 2020-09-21 PROCEDURE — 86803 HEPATITIS C AB TEST: CPT

## 2020-09-21 PROCEDURE — 99152 MOD SED SAME PHYS/QHP 5/>YRS: CPT

## 2020-09-21 PROCEDURE — 83935 ASSAY OF URINE OSMOLALITY: CPT

## 2020-09-21 PROCEDURE — 71045 X-RAY EXAM CHEST 1 VIEW: CPT

## 2020-09-21 PROCEDURE — 85730 THROMBOPLASTIN TIME PARTIAL: CPT

## 2020-09-21 PROCEDURE — 71250 CT THORAX DX C-: CPT

## 2020-09-21 PROCEDURE — 84157 ASSAY OF PROTEIN OTHER: CPT

## 2020-09-21 PROCEDURE — 86900 BLOOD TYPING SEROLOGIC ABO: CPT

## 2020-09-21 PROCEDURE — 80074 ACUTE HEPATITIS PANEL: CPT

## 2020-09-21 PROCEDURE — 87015 SPECIMEN INFECT AGNT CONCNTJ: CPT

## 2020-09-21 PROCEDURE — 86709 HEPATITIS A IGM ANTIBODY: CPT

## 2020-09-21 PROCEDURE — 86901 BLOOD TYPING SEROLOGIC RH(D): CPT

## 2020-09-21 PROCEDURE — 87640 STAPH A DNA AMP PROBE: CPT

## 2020-09-21 PROCEDURE — 87340 HEPATITIS B SURFACE AG IA: CPT

## 2020-09-21 PROCEDURE — 88342 IMHCHEM/IMCYTCHM 1ST ANTB: CPT

## 2020-09-21 PROCEDURE — 84300 ASSAY OF URINE SODIUM: CPT

## 2020-09-21 PROCEDURE — 84443 ASSAY THYROID STIM HORMONE: CPT

## 2020-09-21 PROCEDURE — 82042 OTHER SOURCE ALBUMIN QUAN EA: CPT

## 2020-09-21 PROCEDURE — 99233 SBSQ HOSP IP/OBS HIGH 50: CPT

## 2020-09-21 PROCEDURE — 83036 HEMOGLOBIN GLYCOSYLATED A1C: CPT

## 2020-09-21 PROCEDURE — 82945 GLUCOSE OTHER FLUID: CPT

## 2020-09-21 PROCEDURE — 82962 GLUCOSE BLOOD TEST: CPT

## 2020-09-21 PROCEDURE — 81001 URINALYSIS AUTO W/SCOPE: CPT

## 2020-09-21 PROCEDURE — 83615 LACTATE (LD) (LDH) ENZYME: CPT

## 2020-09-21 PROCEDURE — 93005 ELECTROCARDIOGRAM TRACING: CPT

## 2020-09-21 PROCEDURE — 87070 CULTURE OTHR SPECIMN AEROBIC: CPT

## 2020-09-21 PROCEDURE — 87116 MYCOBACTERIA CULTURE: CPT

## 2020-09-21 RX ORDER — CALCITRIOL 0.5 UG/1
1 CAPSULE ORAL
Qty: 30 | Refills: 1
Start: 2020-09-21 | End: 2020-11-19

## 2020-09-21 RX ORDER — ATORVASTATIN CALCIUM 80 MG/1
1 TABLET, FILM COATED ORAL
Qty: 30 | Refills: 1
Start: 2020-09-21 | End: 2020-11-19

## 2020-09-21 RX ORDER — QUETIAPINE FUMARATE 200 MG/1
1 TABLET, FILM COATED ORAL
Qty: 0 | Refills: 0 | DISCHARGE

## 2020-09-21 RX ORDER — FLUTICASONE PROPIONATE 50 MCG
1 SPRAY, SUSPENSION NASAL
Qty: 60 | Refills: 1
Start: 2020-09-21 | End: 2020-11-19

## 2020-09-21 RX ORDER — MIRTAZAPINE 45 MG/1
1 TABLET, ORALLY DISINTEGRATING ORAL
Qty: 0 | Refills: 0 | DISCHARGE

## 2020-09-21 RX ORDER — ZOLPIDEM TARTRATE 10 MG/1
1 TABLET ORAL
Qty: 0 | Refills: 0 | DISCHARGE

## 2020-09-21 RX ORDER — DOXEPIN HCL 100 MG
1 CAPSULE ORAL
Qty: 0 | Refills: 0 | DISCHARGE

## 2020-09-21 RX ORDER — METOPROLOL TARTRATE 50 MG
1 TABLET ORAL
Qty: 30 | Refills: 1
Start: 2020-09-21 | End: 2020-11-19

## 2020-09-21 RX ORDER — ALBUTEROL 90 UG/1
2 AEROSOL, METERED ORAL
Qty: 0 | Refills: 0 | DISCHARGE

## 2020-09-21 RX ORDER — ACETAMINOPHEN 500 MG
650 TABLET ORAL ONCE
Refills: 0 | Status: COMPLETED | OUTPATIENT
Start: 2020-09-21 | End: 2020-09-21

## 2020-09-21 RX ORDER — ERGOCALCIFEROL 1.25 MG/1
1 CAPSULE ORAL
Qty: 30 | Refills: 1
Start: 2020-09-21 | End: 2020-11-19

## 2020-09-21 RX ORDER — AMLODIPINE BESYLATE 2.5 MG/1
1 TABLET ORAL
Qty: 0 | Refills: 0 | DISCHARGE

## 2020-09-21 RX ORDER — NICOTINE POLACRILEX 2 MG
0 GUM BUCCAL
Qty: 0 | Refills: 0 | DISCHARGE
Start: 2020-09-21

## 2020-09-21 RX ADMIN — Medication 1 TABLET(S): at 08:42

## 2020-09-21 RX ADMIN — HEPARIN SODIUM 5000 UNIT(S): 5000 INJECTION INTRAVENOUS; SUBCUTANEOUS at 09:18

## 2020-09-21 RX ADMIN — Medication 40 MILLIGRAM(S): at 08:42

## 2020-09-21 RX ADMIN — Medication 1 SPRAY(S): at 06:02

## 2020-09-21 RX ADMIN — Medication 650 MILLIGRAM(S): at 06:19

## 2020-09-21 RX ADMIN — CALCITRIOL 0.5 MICROGRAM(S): 0.5 CAPSULE ORAL at 08:42

## 2020-09-21 RX ADMIN — ALBUTEROL 2 PUFF(S): 90 AEROSOL, METERED ORAL at 09:28

## 2020-09-21 RX ADMIN — Medication 1 TABLET(S): at 06:02

## 2020-09-21 RX ADMIN — ERGOCALCIFEROL 50000 UNIT(S): 1.25 CAPSULE ORAL at 12:56

## 2020-09-21 RX ADMIN — SODIUM CHLORIDE 3 MILLILITER(S): 9 INJECTION INTRAMUSCULAR; INTRAVENOUS; SUBCUTANEOUS at 13:52

## 2020-09-21 RX ADMIN — Medication 50 MILLIGRAM(S): at 06:02

## 2020-09-21 RX ADMIN — SODIUM CHLORIDE 3 MILLILITER(S): 9 INJECTION INTRAMUSCULAR; INTRAVENOUS; SUBCUTANEOUS at 05:54

## 2020-09-21 RX ADMIN — AMLODIPINE BESYLATE 10 MILLIGRAM(S): 2.5 TABLET ORAL at 06:02

## 2020-09-21 RX ADMIN — PANTOPRAZOLE SODIUM 40 MILLIGRAM(S): 20 TABLET, DELAYED RELEASE ORAL at 06:02

## 2020-09-21 NOTE — PROGRESS NOTE ADULT - PROBLEM SELECTOR PROBLEM 5
Current smoker

## 2020-09-21 NOTE — PROGRESS NOTE ADULT - PROBLEM SELECTOR PLAN 6
OOB to chair, encourage ambulation  Continue SCDs for DVT prophylaxis  Continue Pantoprazole for GI prophylaxis    Plan needs to discuss with Thoracic team on AM rounds
OOB to chair, encourage ambulation  Continue Heparin SQ & SCDs for DVT prophylaxis  Continue Protonix for GI prophylaxis
OOB to chair, encourage ambulation  Continue Heparin SQ & SCDs for DVT prophylaxis  Continue Protonix for GI prophylaxis
OOB to chair, encourage ambulation  Continue SCDs for DVT prophylaxis  Continue Pantoprazole for GI prophylaxis    Plan needs to discuss with Thoracic team on AM rounds
SCDs for DVT prophylaxis  Pantoprazole for GI prophylaxis    Plan needs to discuss with Thoracic team on AM rounds
SCDs for DVT prophylaxis  Pantoprazole for GI prophylaxis

## 2020-09-21 NOTE — PROGRESS NOTE ADULT - PROBLEM SELECTOR PLAN 7
Febrile yesterday, blood culture spending  Remains afebrile  ID consulted Ceftriaxone & Doxy completed on 9/17/2020

## 2020-09-21 NOTE — PROGRESS NOTE ADULT - ASSESSMENT
1) CKD IV  2) Renal bone dx  3) HTN  4) Anemia of renal dx    On calcitriol daily;  BP controlled  Will likely require dialysis within months;   Discussed with patient in detail; needs outpatient follow up;    d/w Dr Smith and CTS
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
## 2020-09-21 NOTE — PROGRESS NOTE ADULT - SUBJECTIVE AND OBJECTIVE BOX
Subjective: Patient lying in bed, no acute distress noted, denies chest pain, palpitation, dizziness, headache, syncope, shortness of breath, abdominal pain, N/V.     VITAL SIGNS  Vital Signs Last 24 Hrs  T(C): 37.6 (20 @ 21:00), Max: 37.6 (20 @ 21:00)  T(F): 99.6 (20 @ 21:00), Max: 99.6 (20 @ 21:00)  HR: 88 (20 @ 21:05) (87 - 106)  BP: 106/53 (20 @ 21:00) (106/53 - 152/93)  RR: 18 (20 @ :00) (18 - 20)  SpO2: 94% (20 @ 21:05) (93% - 96%)  on 3 L O2    Telemetry/Alarms:  SR    MEDICATIONS  ALBUTerol    90 MICROgram(s) HFA Inhaler 2 Puff(s) Inhalation every 6 hours  amLODIPine   Tablet 10 milliGRAM(s) Oral daily  atorvastatin 40 milliGRAM(s) Oral at bedtime  calcitriol   Capsule 0.5 MICROGram(s) Oral daily  ergocalciferol 34259 Unit(s) Oral <User Schedule>  fluticasone propionate 50 MICROgram(s)/spray Nasal Spray 1 Spray(s) Both Nostrils two times a day  furosemide   Injectable 40 milliGRAM(s) IV Push daily  heparin   Injectable 5000 Unit(s) SubCutaneous every 12 hours  influenza   Vaccine 0.5 milliLiter(s) IntraMuscular once  lactobacillus acidophilus 1 Tablet(s) Oral two times a day  melatonin 5 milliGRAM(s) Oral at bedtime PRN  metoprolol tartrate 50 milliGRAM(s) Oral two times a day  mirtazapine Soltab 15 milliGRAM(s) Oral at bedtime  Nephro-vanna 1 Tablet(s) Oral daily  nicotine - 21 mG/24Hr(s) Patch 1 patch Transdermal daily  pantoprazole    Tablet 40 milliGRAM(s) Oral before breakfast  QUEtiapine 300 milliGRAM(s) Oral daily  sodium chloride 0.9% lock flush 3 milliLiter(s) IV Push every 8 hours      PHYSICAL EXAM  General: well nourished, well developed, no acute distress  Neurology: alert and oriented x 3, nonfocal, no gross deficits  Respiratory: diminished breath sounds at the bases bilaterally, no accessory muscle use noted.   CV: regular rate and rhythm, normal S1, S2  Abdomen: soft, nontender, nondistended, positive bowel sounds  Extremities: warm, well perfused. +1 BLE edema L>R. + DP pulses bilaterally  Psych: Appropriate mood and affect         @ 07:01  -   @ 07:00  --------------------------------------------------------  IN: 600 mL / OUT: 1500 mL / NET: -900 mL     @ 07:01  -   @ 02:13  --------------------------------------------------------  IN: 480 mL / OUT: 600 mL / NET: -120 mL        Weights:  Daily     Daily Weight in k (20 Sep 2020 04:42)  Admit Wt: Drug Dosing Weight  Height (cm): 165.1 (13 Sep 2020 00:15)  Weight (kg): 96.9 (13 Sep 2020 00:15)  BMI (kg/m2): 35.5 (13 Sep 2020 00:15)  BSA (m2): 2.03 (13 Sep 2020 00:15)    All laboratory results, radiology and medications reviewed.    LABS      134<L>  |  95<L>  |  88.0<H>  ----------------------------<  92  5.3   |  25.0  |  4.71<H>    Ca    9.1      20 Sep 2020 06:01  Mg     1.9         TPro  x   /  Alb  x   /  TBili  x   /  DBili  x   /  AST  x   /  ALT  19  /  AlkPhos  x                                    11.4   7.38  )-----------( 270      ( 20 Sep 2020 06:01 )             37.4              < from: Xray Chest 1 View- PORTABLE-Routine (Xray Chest 1 View- PORTABLE-Routine in AM.) (20 @ 05:55) >    FINDINGS:    Single frontal view of the chest demonstrates moderate CHF. Small bilateral pleural effusions. Right axillary midline catheter. The cardiomediastinal silhouette is enlarged. No acute osseous abnormalities. Overlying EKG leads and wires are noted. Pericardial effusion.    IMPRESSION: No interval change.      < end of copied text >    < from: TTE Echo Limited or F/U (09.15.20 @ 10:34) >  Summary:   1. S/P pericardiocentesis. Pericardial drain still in place as per clinical history.   2. Hyperdynamic wall motion.   3. Left ventricular ejection fraction, by visual estimation, is >75%.   4. Normal right ventricular size and function.   5. Small pericardial effusion. Small localized loculated LV posterior . No echo evidence of tamponade.   6. Tissue Doppler not obtained. Respiratory variation and paradoxical septal motion . May suggest constriction. repeat study dedicated study to evaluate for contriction if clinically indicated      < end of copied text >    PAST MEDICAL & SURGICAL HISTORY:  Current smoker    CKD (chronic kidney disease)    HTN (hypertension)    COPD, mild

## 2020-09-21 NOTE — PROGRESS NOTE ADULT - SUBJECTIVE AND OBJECTIVE BOX
[facility] DIVISION OF KIDNEY DISEASES AND HYPERTENSION -- FOLLOW UP NOTE  --------------------------------------------------------------------------------  Chief Complaint:   CKDV    24 hour events/subjective:  Seen/examined  Lying in bed in NAD;       PAST HISTORY  --------------------------------------------------------------------------------  No significant changes to PMH, PSH, FHx, SHx, unless otherwise noted    ALLERGIES & MEDICATIONS  --------------------------------------------------------------------------------  Allergies    No Known Allergies    Intolerances      Standing Inpatient Medications  ALBUTerol    90 MICROgram(s) HFA Inhaler 2 Puff(s) Inhalation every 6 hours  amLODIPine   Tablet 10 milliGRAM(s) Oral daily  atorvastatin 40 milliGRAM(s) Oral at bedtime  calcitriol   Capsule 0.5 MICROGram(s) Oral daily  ergocalciferol 04919 Unit(s) Oral <User Schedule>  fluticasone propionate 50 MICROgram(s)/spray Nasal Spray 1 Spray(s) Both Nostrils two times a day  heparin   Injectable 5000 Unit(s) SubCutaneous every 12 hours  influenza   Vaccine 0.5 milliLiter(s) IntraMuscular once  lactobacillus acidophilus 1 Tablet(s) Oral two times a day  metoprolol tartrate 50 milliGRAM(s) Oral two times a day  mirtazapine Soltab 15 milliGRAM(s) Oral at bedtime  Nephro-vanna 1 Tablet(s) Oral daily  nicotine - 21 mG/24Hr(s) Patch 1 patch Transdermal daily  pantoprazole    Tablet 40 milliGRAM(s) Oral before breakfast  QUEtiapine 300 milliGRAM(s) Oral daily  sodium chloride 0.9% lock flush 3 milliLiter(s) IV Push every 8 hours  torsemide 20 milliGRAM(s) Oral daily    PRN Inpatient Medications  melatonin 5 milliGRAM(s) Oral at bedtime PRN      REVIEW OF SYSTEMS  --------------------------------------------------------------------------------  Gen: No weight changes, fatigue, fevers/chills, weakness  Skin: No rashes  Head/Eyes/Ears/Mouth: No headache; Normal hearing; Normal vision w/o blurriness; No sinus pain/discomfort, sore throat  Respiratory: No dyspnea, cough, wheezing, hemoptysis  CV: No chest pain, PND, orthopnea  GI: No abdominal pain, diarrhea, constipation, nausea, vomiting, melena, hematochezia  : No increased frequency, dysuria, hematuria, nocturia  MSK: No joint pain/swelling; no back pain; no edema  Neuro: No dizziness/lightheadedness, weakness, seizures, numbness, tingling  Heme: No easy bruising or bleeding  Endo: No heat/cold intolerance  Psych: No significant nervousness, anxiety, stress, depression    All other systems were reviewed and are negative, except as noted.    VITALS/PHYSICAL EXAM  --------------------------------------------------------------------------------  T(C): 36.5 (09-21-20 @ 10:00), Max: 37.6 (09-20-20 @ 21:00)  HR: 82 (09-21-20 @ 10:00) (82 - 101)  BP: 111/67 (09-21-20 @ 10:00) (106/53 - 127/82)  RR: 18 (09-21-20 @ 10:00) (18 - 18)  SpO2: 98% (09-21-20 @ 10:00) (92% - 98%)  Wt(kg): --        09-20-20 @ 07:01  -  09-21-20 @ 07:00  --------------------------------------------------------  IN: 720 mL / OUT: 1900 mL / NET: -1180 mL      Physical Exam:  	Gen: NAD   	HEENT: PERRL, supple neck, clear oropharynx  	Pulm: CTA B/L  	CV: RRR, S1S2; no rub  	Back: No spinal or CVA tenderness; no sacral edema  	Abd: +BS, soft, nontender/nondistended  	: No suprapubic tenderness  	UE: Warm, FROM, no clubbing, intact strength; no edema; no asterixis  	LE: Warm, FROM, no clubbing, intact strength; no edema  	Neuro: No focal deficits, intact gait  	Psych: Normal affect and mood  	Skin: Warm, without rashes  	Vascular access:    LABS/STUDIES  --------------------------------------------------------------------------------              11.4   6.97  >-----------<  299      [09-21-20 @ 06:00]              36.4     135  |  94  |  90.0  ----------------------------<  120      [09-21-20 @ 06:00]  5.3   |  26.0  |  4.25        Ca     9.4     [09-21-20 @ 06:00]      Mg     1.9     [09-21-20 @ 06:00]      Phos  5.1     [09-21-20 @ 06:00]    TPro  6.7  /  Alb  3.0  /  TBili  <0.2  /  DBili  x   /  AST  24  /  ALT  23  /  AlkPhos  93  [09-21-20 @ 06:00]          Creatinine Trend:  SCr 4.25 [09-21 @ 06:00]  SCr 4.71 [09-20 @ 06:01]  SCr 4.53 [09-19 @ 16:03]  SCr 4.57 [09-19 @ 06:09]  SCr 4.18 [09-18 @ 12:57]    Urinalysis - [09-19-20 @ 21:44]      Color Yellow / Appearance Clear / SG 1.015 / pH 6.0      Gluc Negative / Ketone Negative  / Bili Negative / Urobili Negative       Blood Trace / Protein 100 / Leuk Est Negative / Nitrite Negative      RBC 0-2 / WBC Negative / Hyaline  / Gran  / Sq Epi  / Non Sq Epi Occasional / Bacteria     Urine Protein 166.0      [09-15-20 @ 06:56]  Urine Sodium 45      [09-15-20 @ 06:56]  Urine Potassium 16      [09-15-20 @ 06:56]  Urine Osmolality 287      [09-15-20 @ 06:57]    PTH -- (Ca 8.9)      [09-15-20 @ 15:58]   225  Vitamin D (25OH) 16.0      [09-15-20 @ 15:58]  TSH 1.86      [09-13-20 @ 00:47]    HBsAb <3.0      [09-20-20 @ 01:29]  HBsAg Nonreact      [09-20-20 @ 14:40]  HBcAb Nonreact      [09-20-20 @ 01:29]  HCV 0.12, Nonreact      [09-20-20 @ 14:40]

## 2020-09-21 NOTE — PROGRESS NOTE ADULT - PROVIDER SPECIALTY LIST ADULT
Cardiology
Hospitalist
Nephrology
Thoracic Surgery
Cardiology
Hospitalist
Thoracic Surgery
CT Surgery
Thoracic Surgery

## 2020-09-21 NOTE — PROGRESS NOTE ADULT - PROBLEM SELECTOR PROBLEM 1
Pericardial effusion

## 2020-09-21 NOTE — PROGRESS NOTE ADULT - PROBLEM SELECTOR PLAN 10
Continue Lopressor 50 BID  Continue Norvasc    Plan needs to discuss wt AM team
Lopressor increased to 50mg BID for rate control  Continue Norvasc

## 2020-09-21 NOTE — PROGRESS NOTE ADULT - SUBJECTIVE AND OBJECTIVE BOX
Patient seen and examined . Sitting comfortable in the bed , no complaints , denies sob , chest pain . States wants to go Home .     CC : sob - resolved     MEDICATIONS  (STANDING):  ALBUTerol    90 MICROgram(s) HFA Inhaler 2 Puff(s) Inhalation every 6 hours  amLODIPine   Tablet 10 milliGRAM(s) Oral daily  atorvastatin 40 milliGRAM(s) Oral at bedtime  calcitriol   Capsule 0.5 MICROGram(s) Oral daily  ergocalciferol 86450 Unit(s) Oral <User Schedule>  fluticasone propionate 50 MICROgram(s)/spray Nasal Spray 1 Spray(s) Both Nostrils two times a day  heparin   Injectable 5000 Unit(s) SubCutaneous every 12 hours  influenza   Vaccine 0.5 milliLiter(s) IntraMuscular once  lactobacillus acidophilus 1 Tablet(s) Oral two times a day  metoprolol tartrate 50 milliGRAM(s) Oral two times a day  mirtazapine Soltab 15 milliGRAM(s) Oral at bedtime  Nephro-vanna 1 Tablet(s) Oral daily  nicotine - 21 mG/24Hr(s) Patch 1 patch Transdermal daily  pantoprazole    Tablet 40 milliGRAM(s) Oral before breakfast  QUEtiapine 300 milliGRAM(s) Oral daily  sodium chloride 0.9% lock flush 3 milliLiter(s) IV Push every 8 hours  torsemide 20 milliGRAM(s) Oral daily    MEDICATIONS  (PRN):  melatonin 5 milliGRAM(s) Oral at bedtime PRN Insomnia      LABS:                          11.4   6.97  )-----------( 299      ( 21 Sep 2020 06:00 )             36.4     09-    135  |  94<L>  |  90.0<H>  ----------------------------<  120<H>  5.3   |  26.0  |  4.25<H>    Ca    9.4      21 Sep 2020 06:00  Phos  5.1     -  Mg     1.9         TPro  6.7  /  Alb  3.0<L>  /  TBili  <0.2<L>  /  DBili  x   /  AST  24  /  ALT  23  /  AlkPhos  93        Urinalysis Basic - ( 19 Sep 2020 21:44 )    Color: Yellow / Appearance: Clear / S.015 / pH: x  Gluc: x / Ketone: Negative  / Bili: Negative / Urobili: Negative mg/dL   Blood: x / Protein: 100 mg/dL / Nitrite: Negative   Leuk Esterase: Negative / RBC: 0-2 /HPF / WBC Negative   Sq Epi: x / Non Sq Epi: Occasional / Bacteria: x    RADIOLOGY & ADDITIONAL TESTS:          REVIEW OF SYSTEMS:    All systems are reviewed and are negative .    Vital Signs Last 24 Hrs  T(C): 36.5 (21 Sep 2020 10:00), Max: 37.6 (20 Sep 2020 21:00)  T(F): 97.7 (21 Sep 2020 10:00), Max: 99.6 (20 Sep 2020 21:00)  HR: 82 (21 Sep 2020 10:00) (82 - 102)  BP: 111/67 (21 Sep 2020 10:00) (106/53 - 127/82)  BP(mean): --  RR: 18 (21 Sep 2020 10:00) (18 - 18)  SpO2: 98% (21 Sep 2020 10:00) (92% - 98%)    PHYSICAL EXAM:    GENERAL: NAD, well-groomed, well-developed  HEAD:  Atraumatic, Normocephalic  EYES: EOMI, PERRLA, conjunctiva and sclera clear  NECK: Supple, No JVD, Normal thyroid  NERVOUS SYSTEM:  Alert & Oriented X3, no focal deficit  CHEST/LUNG: CTA b/l ,  no  rales, rhonchi, wheezing, or rubs  HEART: Regular rate and rhythm; No murmurs, rubs, or gallops  ABDOMEN: Soft, Nontender, Nondistended; Bowel sounds present  EXTREMITIES:  2+ Peripheral Pulses, No clubbing, cyanosis, or edema  LYMPH: No lymphadenopathy noted  SKIN: No rashes or lesions

## 2020-09-21 NOTE — PROGRESS NOTE ADULT - PROBLEM SELECTOR PLAN 3
Continue consistent DASH consistent carb renal diet  Lopressor increased to 50mg BID, continue Norvasc & lopressor

## 2020-09-21 NOTE — PROGRESS NOTE ADULT - PROBLEM SELECTOR PROBLEM 7
CAP (community acquired pneumonia)
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
## 2020-09-21 NOTE — PROGRESS NOTE ADULT - PROBLEM SELECTOR PLAN 2
Encourage the use of incentive spirometry, cough & deep breathing exercises  OOB to chair, increase ambulation  Continue Albuterol & Flonase  RX for Home O2 sent
Encourage the use of incentive spirometry, cough & deep breathing exercises  OOB to chair, increase ambulation  Continue Proventil & Flonase
Continue inhalers
Encourage the use of incentive spirometry, cough & deep breathing exercises  OOB to chair, increase ambulation  Continue Albuterol & Flonase
Encourage the use of incentive spirometry, cough & deep breathing exercises  OOB to chair, increase ambulation  Continue Albuterol & Flonase  RX for Home O2 sent
Encourage the use of incentive spirometry, cough & deep breathing exercises  OOB to chair, increase ambulation  Continue Proventil & Flonase
Continue inhalers

## 2020-09-21 NOTE — PROGRESS NOTE ADULT - PROBLEM SELECTOR PLAN 5
Smoking cessation provided  Continue Nicotine Patch
Offered smoking cessation education  Nicotine Patch applied
Smoking cessation provided  Continue Nicoderm Patch
Smoking cessation provided  Continue Nicoderm Patch
Smoking cessation provided  Continue Nicotine Patch
Pt offered smoking cessation education  Nicotine Patch applied

## 2020-09-21 NOTE — PROGRESS NOTE ADULT - PROBLEM SELECTOR PLAN 1
Now s/p pericardiocentesis on 9/13, s/p removal of drain on 9/16  Currently hemodynamically stable  Continue Tylenol PRN for pain management  Discharge to home with oxygen possibly later today, pending renal plan for HD.  Patient had occasional desaturation to mid 80s, placed on 3LO2,    Encourage cough, deep breathing, incentive spirometry  CXR in AM

## 2020-09-21 NOTE — PROGRESS NOTE ADULT - PROBLEM SELECTOR PLAN 4
Will need HD as per Renal   Hepatitis panel completed  BUN/Creat unchanged, trend renal function  Continue lasix   Strict I/O's  Continue Nephrovite  Continue to hold nephrotoxic medication  PPD implanted on 9/19 to left forearm, to be read on 9/21/2020

## 2020-09-22 PROBLEM — I10 ESSENTIAL (PRIMARY) HYPERTENSION: Chronic | Status: ACTIVE | Noted: 2020-09-13

## 2020-09-22 PROBLEM — F17.200 NICOTINE DEPENDENCE, UNSPECIFIED, UNCOMPLICATED: Chronic | Status: ACTIVE | Noted: 2020-09-13

## 2020-09-22 PROBLEM — J44.9 CHRONIC OBSTRUCTIVE PULMONARY DISEASE, UNSPECIFIED: Chronic | Status: ACTIVE | Noted: 2020-09-13

## 2020-09-22 PROBLEM — N18.9 CHRONIC KIDNEY DISEASE, UNSPECIFIED: Chronic | Status: ACTIVE | Noted: 2020-09-13

## 2020-09-23 LAB
APOE INTERPRETATION: SIGNIFICANT CHANGE UP
APOE REASON FOR REFERRAL: SIGNIFICANT CHANGE UP
APOE RELEASED BY: SIGNIFICANT CHANGE UP
APOE RESULT SUMMARY: SIGNIFICANT CHANGE UP
APOE RESULT: SIGNIFICANT CHANGE UP
APOE SPECIMEN: SIGNIFICANT CHANGE UP

## 2020-09-24 LAB
CULTURE RESULTS: SIGNIFICANT CHANGE UP
CULTURE RESULTS: SIGNIFICANT CHANGE UP
SPECIMEN SOURCE: SIGNIFICANT CHANGE UP
SPECIMEN SOURCE: SIGNIFICANT CHANGE UP

## 2020-09-25 ENCOUNTER — APPOINTMENT (OUTPATIENT)
Dept: NEPHROLOGY | Facility: CLINIC | Age: 55
End: 2020-09-25
Payer: MEDICAID

## 2020-09-25 VITALS
HEIGHT: 62 IN | DIASTOLIC BLOOD PRESSURE: 82 MMHG | SYSTOLIC BLOOD PRESSURE: 130 MMHG | HEART RATE: 95 BPM | BODY MASS INDEX: 38.46 KG/M2 | WEIGHT: 209 LBS | TEMPERATURE: 97.3 F | OXYGEN SATURATION: 97 %

## 2020-09-25 PROCEDURE — 99204 OFFICE O/P NEW MOD 45 MIN: CPT

## 2020-09-25 PROCEDURE — 99214 OFFICE O/P EST MOD 30 MIN: CPT

## 2020-09-25 NOTE — PHYSICAL EXAM
[General Appearance - Alert] : alert [General Appearance - In No Acute Distress] : in no acute distress [Sclera] : the sclera and conjunctiva were normal [Outer Ear] : the ears and nose were normal in appearance [Hearing Threshold Finger Rub Not [place]] : hearing was normal [Oropharynx] : the oropharynx was normal [Neck Appearance] : the appearance of the neck was normal [Auscultation Breath Sounds / Voice Sounds] : lungs were clear to auscultation bilaterally [Heart Sounds] : normal S1 and S2 [FreeTextEntry1] : b/l LE edema [Abdomen Soft] : soft [Abdomen Tenderness] : non-tender [No CVA Tenderness] : no ~M costovertebral angle tenderness [Abnormal Walk] : normal gait [Nail Clubbing] : no clubbing  or cyanosis of the fingernails [] : no rash [No Focal Deficits] : no focal deficits [Oriented To Time, Place, And Person] : oriented to person, place, and time

## 2020-09-25 NOTE — ASSESSMENT
[FreeTextEntry1] : CKD stage V\par Pt with advanced CKD, multiple comorbidities\par needs to be prepared for HD\par Pt however, wants to discuss with family members- will come in two weeks with  to discuss further\par \par HTN- BP stable\par continue amlodipine and metoprolol\par \par CKD- MBD- continue calcitriol \par \par Anemia of CKD\par Hb at goal 11.4\par \par RTC in 2 weeks\par \par \par

## 2020-09-25 NOTE — REVIEW OF SYSTEMS
[Fever] : no fever [Chills] : no chills [Eye Pain] : no eye pain [Nosebleeds] : no nosebleeds [Nasal Discharge] : no nasal discharge [Chest Pain] : no chest pain [Palpitations] : no palpitations [Lower Ext Edema] : no lower extremity edema [Wheezing] : wheezing [Cough] : no cough [SOB on Exertion] : shortness of breath during exertion [Constipation] : no constipation [Diarrhea] : no diarrhea [Joint Swelling] : no joint swelling [Joint Stiffness] : no joint stiffness [Skin Lesions] : no skin lesions [Itching] : no itching [Dizziness] : no dizziness [Fainting] : no fainting [Anxiety] : no anxiety [Depression] : depression [Easy Bleeding] : no tendency for easy bleeding [Easy Bruising] : no tendency for easy bruising
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
## 2020-09-28 LAB — VIRUS SPEC CULT: SIGNIFICANT CHANGE UP

## 2020-10-14 LAB
CULTURE RESULTS: SIGNIFICANT CHANGE UP
SPECIMEN SOURCE: SIGNIFICANT CHANGE UP

## 2020-10-30 ENCOUNTER — APPOINTMENT (OUTPATIENT)
Dept: NEPHROLOGY | Facility: CLINIC | Age: 55
End: 2020-10-30
Payer: MEDICAID

## 2020-10-30 VITALS
HEIGHT: 62 IN | HEART RATE: 118 BPM | WEIGHT: 198 LBS | DIASTOLIC BLOOD PRESSURE: 82 MMHG | SYSTOLIC BLOOD PRESSURE: 110 MMHG | BODY MASS INDEX: 36.44 KG/M2

## 2020-10-30 DIAGNOSIS — N18.5 CHRONIC KIDNEY DISEASE, STAGE 5: ICD-10-CM

## 2020-10-30 DIAGNOSIS — I10 ESSENTIAL (PRIMARY) HYPERTENSION: ICD-10-CM

## 2020-10-30 DIAGNOSIS — E66.9 OBESITY, UNSPECIFIED: ICD-10-CM

## 2020-10-30 DIAGNOSIS — M89.9 CHRONIC KIDNEY DISEASE, UNSPECIFIED: ICD-10-CM

## 2020-10-30 DIAGNOSIS — N18.9 CHRONIC KIDNEY DISEASE, UNSPECIFIED: ICD-10-CM

## 2020-10-30 DIAGNOSIS — E83.9 CHRONIC KIDNEY DISEASE, UNSPECIFIED: ICD-10-CM

## 2020-10-30 PROCEDURE — 99215 OFFICE O/P EST HI 40 MIN: CPT

## 2020-10-30 PROCEDURE — 99072 ADDL SUPL MATRL&STAF TM PHE: CPT

## 2020-10-30 NOTE — ASSESSMENT
[FreeTextEntry1] : CKD stage V\par Pt with advanced CKD, multiple comorbidities- Likely will need HD in near future.\par \par Pt now with respiratory  distress, desaturating on room air (90%), tachycardic \par Needs to go to ED for further evaluation- refuse ambulance\par States she wants to go home first and will go to [facility] \par \par \par HTN- BP stable\par on amlodipine and metoprolol\par \par CKD- MBD- continue calcitriol \par \par Anemia of CKD\par Hb at goal \par Monitor H/H\par \par \par

## 2020-10-30 NOTE — PHYSICAL EXAM
[General Appearance - Alert] : alert [Sclera] : the sclera and conjunctiva were normal [Outer Ear] : the ears and nose were normal in appearance [Hearing Threshold Finger Rub Not [place]] : hearing was normal [Oropharynx] : the oropharynx was normal [Neck Appearance] : the appearance of the neck was normal [Heart Sounds] : normal S1 and S2 [Abdomen Soft] : soft [Abdomen Tenderness] : non-tender [No CVA Tenderness] : no ~M costovertebral angle tenderness [Abnormal Walk] : normal gait [Nail Clubbing] : no clubbing  or cyanosis of the fingernails [] : no rash [No Focal Deficits] : no focal deficits [Oriented To Time, Place, And Person] : oriented to person, place, and time [FreeTextEntry1] : b/l LE edema

## 2020-10-30 NOTE — REVIEW OF SYSTEMS
[SOB on Exertion] : shortness of breath during exertion [Depression] : depression [Feeling Tired] : feeling tired [Shortness Of Breath] : shortness of breath [Fever] : no fever [Chills] : no chills [Eye Pain] : no eye pain [Nosebleeds] : no nosebleeds [Nasal Discharge] : no nasal discharge [Chest Pain] : no chest pain [Palpitations] : no palpitations [Lower Ext Edema] : no lower extremity edema [Wheezing] : no wheezing [Cough] : no cough [Constipation] : no constipation [Diarrhea] : no diarrhea [Joint Swelling] : no joint swelling [Joint Stiffness] : no joint stiffness [Skin Lesions] : no skin lesions [Itching] : no itching [Dizziness] : no dizziness [Fainting] : no fainting [Anxiety] : no anxiety [Easy Bleeding] : no tendency for easy bleeding [Easy Bruising] : no tendency for easy bruising
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
## 2020-11-04 LAB
CULTURE RESULTS: SIGNIFICANT CHANGE UP
SPECIMEN SOURCE: SIGNIFICANT CHANGE UP

## 2023-04-21 NOTE — DISCHARGE NOTE NURSING/CASE MANAGEMENT/SOCIAL WORK - HAS THE PATIENT USED TOBACCO IN THE PAST 30 DAYS?
Yes
For information on Fall & Injury Prevention, visit: [email or web address] OR  [email or web address] OR  https://www.cdc.gov/steadi/patient.html

## 2024-03-10 PROBLEM — N18.9 CHRONIC KIDNEY DISEASE-MINERAL AND BONE DISORDER: Status: ACTIVE | Noted: 2020-09-25
